# Patient Record
Sex: FEMALE
[De-identification: names, ages, dates, MRNs, and addresses within clinical notes are randomized per-mention and may not be internally consistent; named-entity substitution may affect disease eponyms.]

---

## 2018-05-16 ENCOUNTER — APPOINTMENT (OUTPATIENT)
Dept: FAMILY MEDICINE | Facility: CLINIC | Age: 83
End: 2018-05-16
Payer: MEDICARE

## 2018-05-16 VITALS
SYSTOLIC BLOOD PRESSURE: 166 MMHG | BODY MASS INDEX: 23.99 KG/M2 | OXYGEN SATURATION: 99 % | HEART RATE: 93 BPM | RESPIRATION RATE: 16 BRPM | WEIGHT: 119 LBS | HEIGHT: 59 IN | DIASTOLIC BLOOD PRESSURE: 79 MMHG

## 2018-05-16 DIAGNOSIS — Z83.3 FAMILY HISTORY OF DIABETES MELLITUS: ICD-10-CM

## 2018-05-16 DIAGNOSIS — Z78.9 OTHER SPECIFIED HEALTH STATUS: ICD-10-CM

## 2018-05-16 DIAGNOSIS — M10.9 GOUT, UNSPECIFIED: ICD-10-CM

## 2018-05-16 DIAGNOSIS — Z82.49 FAMILY HISTORY OF ISCHEMIC HEART DISEASE AND OTHER DISEASES OF THE CIRCULATORY SYSTEM: ICD-10-CM

## 2018-05-16 PROCEDURE — G0438: CPT

## 2018-05-23 NOTE — HISTORY OF PRESENT ILLNESS
[Family Member] : family member [de-identified] : Patient presents to establish care. She reports feeling well and notes that she has not seen a physician for several months. She experiences some joint pain, particularly in her upper arms, but it is intermittent and resolves with rest.

## 2018-05-23 NOTE — PHYSICAL EXAM
[No Acute Distress] : no acute distress [Well Nourished] : well nourished [Well Developed] : well developed [Well-Appearing] : well-appearing [Normal Sclera/Conjunctiva] : normal sclera/conjunctiva [Normal Outer Ear/Nose] : the outer ears and nose were normal in appearance [Normal Oropharynx] : the oropharynx was normal [No JVD] : no jugular venous distention [Supple] : supple [No Respiratory Distress] : no respiratory distress  [Clear to Auscultation] : lungs were clear to auscultation bilaterally [No Accessory Muscle Use] : no accessory muscle use [Normal Rate] : normal rate  [Regular Rhythm] : with a regular rhythm [Pedal Pulses Present] : the pedal pulses are present [No Edema] : there was no peripheral edema [Soft] : abdomen soft [Non Tender] : non-tender [Normal Bowel Sounds] : normal bowel sounds [Normal Posterior Cervical Nodes] : no posterior cervical lymphadenopathy [No CVA Tenderness] : no CVA  tenderness [No Spinal Tenderness] : no spinal tenderness [No Joint Swelling] : no joint swelling [Grossly Normal Strength/Tone] : grossly normal strength/tone [No Rash] : no rash [Normal Gait] : normal gait [Coordination Grossly Intact] : coordination grossly intact [Normal Affect] : the affect was normal [Normal Mood] : the mood was normal [Normal Insight/Judgement] : insight and judgment were intact [de-identified] : cervical lymphadenopathy [de-identified] : murmur [de-identified] : anterior cervical lymphadenopathy

## 2018-05-23 NOTE — HEALTH RISK ASSESSMENT
[Good] : ~his/her~ current health as good [Very Good] : ~his/her~  mood as very good [No falls in past year] : Patient reported no falls in the past year [0] : 2) Feeling down, depressed, or hopeless: Not at all (0) [] : No [[id number]] : 0 [Change in mental status noted] : No change in mental status noted [Language] : denies difficulty with language [Behavior] : denies difficulty with behavior [Learning/Retaining New Information] : denies difficulty learning/retaining new information [Handling Complex Tasks] : denies difficulty handling complex tasks [Reasoning] : denies difficulty with reasoning [Spatial Ability and Orientation] : denies difficulty with spatial ability and orientation [None] : None [With Family] : lives with family [Retired] : retired [Single] : single [Sexually Active] : not sexually active [Feels Safe at Home] : Feels safe at home [Fully functional (bathing, dressing, toileting, transferring, walking, feeding)] : Fully functional (bathing, dressing, toileting, transferring, walking, feeding) [Fully functional (using the telephone, shopping, preparing meals, housekeeping, doing laundry, using] : Fully functional and needs no help or supervision to perform IADLs (using the telephone, shopping, preparing meals, housekeeping, doing laundry, using transportation, managing medications and managing finances) [Reports changes in hearing] : Reports no changes in hearing [Reports changes in vision] : Reports no changes in vision [Reports changes in dental health] : Reports no changes in dental health

## 2018-05-23 NOTE — PLAN
[FreeTextEntry1] : HCM and DM II- obtain labs\par Hypertension- BP not at goal; patient reports having been under a great deal of stress due to her sister's recent illness; follow up BP in 2 weeks; murmur appreciated on exam- will refer to cardio for echo\par Cervical lymphadenopathy- obtain US neck

## 2018-05-31 ENCOUNTER — RX RENEWAL (OUTPATIENT)
Age: 83
End: 2018-05-31

## 2018-05-31 LAB
25(OH)D3 SERPL-MCNC: 41.8 NG/ML
ALBUMIN SERPL ELPH-MCNC: 4.4 G/DL
ALP BLD-CCNC: 74 U/L
ALT SERPL-CCNC: 14 U/L
ANION GAP SERPL CALC-SCNC: 10 MMOL/L
AST SERPL-CCNC: 22 U/L
BASOPHILS # BLD AUTO: 0.01 K/UL
BASOPHILS NFR BLD AUTO: 0.2 %
BILIRUB SERPL-MCNC: 0.6 MG/DL
BUN SERPL-MCNC: 23 MG/DL
CALCIUM SERPL-MCNC: 9.6 MG/DL
CHLORIDE SERPL-SCNC: 104 MMOL/L
CHOLEST SERPL-MCNC: 105 MG/DL
CHOLEST/HDLC SERPL: 3 RATIO
CO2 SERPL-SCNC: 28 MMOL/L
CREAT SERPL-MCNC: 1.27 MG/DL
EOSINOPHIL # BLD AUTO: 0.08 K/UL
EOSINOPHIL NFR BLD AUTO: 1.7 %
GLUCOSE SERPL-MCNC: 114 MG/DL
HBA1C MFR BLD HPLC: 5.9 %
HCT VFR BLD CALC: 35.3 %
HDLC SERPL-MCNC: 35 MG/DL
HGB BLD-MCNC: 11.7 G/DL
IMM GRANULOCYTES NFR BLD AUTO: 0.2 %
LDLC SERPL CALC-MCNC: 42 MG/DL
LYMPHOCYTES # BLD AUTO: 1.66 K/UL
LYMPHOCYTES NFR BLD AUTO: 34.3 %
MAN DIFF?: NORMAL
MCHC RBC-ENTMCNC: 31.6 PG
MCHC RBC-ENTMCNC: 33.1 GM/DL
MCV RBC AUTO: 95.4 FL
MONOCYTES # BLD AUTO: 0.33 K/UL
MONOCYTES NFR BLD AUTO: 6.8 %
NEUTROPHILS # BLD AUTO: 2.75 K/UL
NEUTROPHILS NFR BLD AUTO: 56.8 %
PLATELET # BLD AUTO: 117 K/UL
POTASSIUM SERPL-SCNC: 3.9 MMOL/L
PROT SERPL-MCNC: 7.2 G/DL
RBC # BLD: 3.7 M/UL
RBC # FLD: 14.7 %
SODIUM SERPL-SCNC: 142 MMOL/L
TRIGL SERPL-MCNC: 142 MG/DL
TSH SERPL-ACNC: 3.22 UIU/ML
WBC # FLD AUTO: 4.84 K/UL

## 2018-08-15 ENCOUNTER — APPOINTMENT (OUTPATIENT)
Dept: CARDIOLOGY | Facility: CLINIC | Age: 83
End: 2018-08-15

## 2018-08-20 ENCOUNTER — RX RENEWAL (OUTPATIENT)
Age: 83
End: 2018-08-20

## 2018-10-04 ENCOUNTER — APPOINTMENT (OUTPATIENT)
Dept: FAMILY MEDICINE | Facility: CLINIC | Age: 83
End: 2018-10-04
Payer: MEDICARE

## 2018-10-04 VITALS
HEART RATE: 86 BPM | SYSTOLIC BLOOD PRESSURE: 170 MMHG | OXYGEN SATURATION: 99 % | DIASTOLIC BLOOD PRESSURE: 73 MMHG | HEIGHT: 59 IN | RESPIRATION RATE: 14 BRPM

## 2018-10-04 PROCEDURE — 99213 OFFICE O/P EST LOW 20 MIN: CPT

## 2018-10-04 RX ORDER — LISINOPRIL AND HYDROCHLOROTHIAZIDE TABLETS 20; 25 MG/1; MG/1
20-25 TABLET ORAL DAILY
Qty: 90 | Refills: 0 | Status: COMPLETED | COMMUNITY
Start: 2018-05-16 | End: 2018-10-04

## 2018-10-12 NOTE — PLAN
[FreeTextEntry1] : Patient with impaired balance and dizziness- need to exclude cardiac and neuro etiologies. Neuro exam is within normal limits on exam. Would like to obtain carotid dopplers, echo and brain imaging however patient's family expresses concerns regarding costs. They indicate that it is less expensive to see a specialist than to undergo imaging and would like to pursue that route at this time. Will obtain labs and refer to cardio and neuro. Patient understands that she should go to the ER immediately for any progression in symptoms. Blood pressure is not well controlled. It is possible that her elevated BP is contributing to her symptoms. Will increase ACE-I now and follow up BP in 2 weeks.

## 2018-10-12 NOTE — PHYSICAL EXAM
[No Acute Distress] : no acute distress [Well Nourished] : well nourished [Well Developed] : well developed [Well-Appearing] : well-appearing [Normal Sclera/Conjunctiva] : normal sclera/conjunctiva [PERRL] : pupils equal round and reactive to light [EOMI] : extraocular movements intact [Normal Outer Ear/Nose] : the outer ears and nose were normal in appearance [Normal Oropharynx] : the oropharynx was normal [Normal TMs] : both tympanic membranes were normal [No JVD] : no jugular venous distention [Supple] : supple [No Lymphadenopathy] : no lymphadenopathy [No Respiratory Distress] : no respiratory distress  [Clear to Auscultation] : lungs were clear to auscultation bilaterally [No Accessory Muscle Use] : no accessory muscle use [Normal Rate] : normal rate  [Regular Rhythm] : with a regular rhythm [Normal S1, S2] : normal S1 and S2 [Pedal Pulses Present] : the pedal pulses are present [No Edema] : there was no peripheral edema [Soft] : abdomen soft [Non Tender] : non-tender [Non-distended] : non-distended [Normal Bowel Sounds] : normal bowel sounds [Normal Supraclavicular Nodes] : no supraclavicular lymphadenopathy [Normal Posterior Cervical Nodes] : no posterior cervical lymphadenopathy [Normal Anterior Cervical Nodes] : no anterior cervical lymphadenopathy [No CVA Tenderness] : no CVA  tenderness [No Spinal Tenderness] : no spinal tenderness [No Joint Swelling] : no joint swelling [Grossly Normal Strength/Tone] : grossly normal strength/tone [No Rash] : no rash [Normal Gait] : normal gait [Coordination Grossly Intact] : coordination grossly intact [No Focal Deficits] : no focal deficits [Normal Affect] : the affect was normal [Normal Insight/Judgement] : insight and judgment were intact [de-identified] : murmur

## 2018-10-12 NOTE — HISTORY OF PRESENT ILLNESS
[FreeTextEntry8] : Patient presents with 2-month history of impaired balance. She reports feeling dizzy. It is worse when standing but also occurs when laying down in bed. No headache, change in vision or weakness.

## 2018-10-16 LAB
ALBUMIN SERPL ELPH-MCNC: 4.7 G/DL
ALP BLD-CCNC: 65 U/L
ALT SERPL-CCNC: 16 U/L
ANION GAP SERPL CALC-SCNC: 18 MMOL/L
AST SERPL-CCNC: 26 U/L
BASOPHILS # BLD AUTO: 0.01 K/UL
BASOPHILS NFR BLD AUTO: 0.2 %
BILIRUB SERPL-MCNC: 0.8 MG/DL
BUN SERPL-MCNC: 31 MG/DL
CALCIUM SERPL-MCNC: 10.2 MG/DL
CHLORIDE SERPL-SCNC: 100 MMOL/L
CHOLEST SERPL-MCNC: 89 MG/DL
CHOLEST/HDLC SERPL: 2.6 RATIO
CO2 SERPL-SCNC: 23 MMOL/L
CREAT SERPL-MCNC: 1.43 MG/DL
CRP SERPL-MCNC: <0.1 MG/DL
EOSINOPHIL # BLD AUTO: 0.04 K/UL
EOSINOPHIL NFR BLD AUTO: 0.7 %
ERYTHROCYTE [SEDIMENTATION RATE] IN BLOOD BY WESTERGREN METHOD: 19 MM/HR
GLUCOSE SERPL-MCNC: 130 MG/DL
HCT VFR BLD CALC: 32.9 %
HDLC SERPL-MCNC: 34 MG/DL
HGB BLD-MCNC: 11 G/DL
IMM GRANULOCYTES NFR BLD AUTO: 0.4 %
LDLC SERPL CALC-MCNC: 33 MG/DL
LYMPHOCYTES # BLD AUTO: 2.36 K/UL
LYMPHOCYTES NFR BLD AUTO: 43.9 %
MAN DIFF?: NORMAL
MCHC RBC-ENTMCNC: 32.4 PG
MCHC RBC-ENTMCNC: 33.4 GM/DL
MCV RBC AUTO: 96.8 FL
MONOCYTES # BLD AUTO: 0.42 K/UL
MONOCYTES NFR BLD AUTO: 7.8 %
NEUTROPHILS # BLD AUTO: 2.52 K/UL
NEUTROPHILS NFR BLD AUTO: 47 %
PLATELET # BLD AUTO: 135 K/UL
POTASSIUM SERPL-SCNC: 4.2 MMOL/L
PROT SERPL-MCNC: 7.3 G/DL
RBC # BLD: 3.4 M/UL
RBC # FLD: 14.9 %
SODIUM SERPL-SCNC: 141 MMOL/L
TRIGL SERPL-MCNC: 112 MG/DL
TSH SERPL-ACNC: 2.5 UIU/ML
VIT B12 SERPL-MCNC: 434 PG/ML
WBC # FLD AUTO: 5.37 K/UL

## 2018-10-22 ENCOUNTER — APPOINTMENT (OUTPATIENT)
Dept: FAMILY MEDICINE | Facility: CLINIC | Age: 83
End: 2018-10-22

## 2018-10-24 ENCOUNTER — APPOINTMENT (OUTPATIENT)
Dept: FAMILY MEDICINE | Facility: CLINIC | Age: 83
End: 2018-10-24
Payer: MEDICARE

## 2018-10-24 VITALS
SYSTOLIC BLOOD PRESSURE: 149 MMHG | RESPIRATION RATE: 14 BRPM | WEIGHT: 119 LBS | BODY MASS INDEX: 23.99 KG/M2 | OXYGEN SATURATION: 98 % | HEIGHT: 59 IN | DIASTOLIC BLOOD PRESSURE: 78 MMHG | HEART RATE: 86 BPM

## 2018-10-24 DIAGNOSIS — N28.9 DISORDER OF KIDNEY AND URETER, UNSPECIFIED: ICD-10-CM

## 2018-10-24 PROCEDURE — 99213 OFFICE O/P EST LOW 20 MIN: CPT

## 2018-10-24 RX ORDER — METFORMIN HYDROCHLORIDE 500 MG/1
500 TABLET, FILM COATED, EXTENDED RELEASE ORAL
Qty: 90 | Refills: 0 | Status: DISCONTINUED | COMMUNITY
Start: 2018-05-16 | End: 2018-10-24

## 2018-10-24 RX ORDER — HYDROCHLOROTHIAZIDE 25 MG/1
25 TABLET ORAL DAILY
Qty: 30 | Refills: 1 | Status: COMPLETED | COMMUNITY
Start: 2018-10-04 | End: 2018-10-24

## 2018-10-24 RX ORDER — LISINOPRIL 30 MG/1
30 TABLET ORAL DAILY
Qty: 30 | Refills: 1 | Status: COMPLETED | COMMUNITY
Start: 2018-10-04 | End: 2018-10-24

## 2018-10-29 ENCOUNTER — APPOINTMENT (OUTPATIENT)
Dept: FAMILY MEDICINE | Facility: CLINIC | Age: 83
End: 2018-10-29

## 2018-10-29 ENCOUNTER — OTHER (OUTPATIENT)
Age: 83
End: 2018-10-29

## 2018-11-01 ENCOUNTER — APPOINTMENT (OUTPATIENT)
Dept: CARDIOLOGY | Facility: CLINIC | Age: 83
End: 2018-11-01
Payer: MEDICARE

## 2018-11-01 PROCEDURE — 93306 TTE W/DOPPLER COMPLETE: CPT

## 2018-11-02 LAB
ANION GAP SERPL CALC-SCNC: 12 MMOL/L
BASOPHILS # BLD AUTO: 0.01 K/UL
BASOPHILS NFR BLD AUTO: 0.2 %
BUN SERPL-MCNC: 28 MG/DL
CALCIUM SERPL-MCNC: 10.2 MG/DL
CHLORIDE SERPL-SCNC: 107 MMOL/L
CO2 SERPL-SCNC: 24 MMOL/L
CREAT SERPL-MCNC: 1.15 MG/DL
EOSINOPHIL # BLD AUTO: 0.06 K/UL
EOSINOPHIL NFR BLD AUTO: 1.5 %
GLUCOSE SERPL-MCNC: 125 MG/DL
HCT VFR BLD CALC: 33.7 %
HGB BLD-MCNC: 10.8 G/DL
IMM GRANULOCYTES NFR BLD AUTO: 0.2 %
LYMPHOCYTES # BLD AUTO: 2.02 K/UL
LYMPHOCYTES NFR BLD AUTO: 50.1 %
MAN DIFF?: NORMAL
MCHC RBC-ENTMCNC: 32 GM/DL
MCHC RBC-ENTMCNC: 32.5 PG
MCV RBC AUTO: 101.5 FL
MONOCYTES # BLD AUTO: 0.4 K/UL
MONOCYTES NFR BLD AUTO: 9.9 %
NEUTROPHILS # BLD AUTO: 1.53 K/UL
NEUTROPHILS NFR BLD AUTO: 38.1 %
PLATELET # BLD AUTO: 116 K/UL
POTASSIUM SERPL-SCNC: 4.6 MMOL/L
RBC # BLD: 3.32 M/UL
RBC # FLD: 14.8 %
SODIUM SERPL-SCNC: 143 MMOL/L
WBC # FLD AUTO: 4.03 K/UL

## 2018-11-06 ENCOUNTER — NON-APPOINTMENT (OUTPATIENT)
Age: 83
End: 2018-11-06

## 2018-11-06 ENCOUNTER — APPOINTMENT (OUTPATIENT)
Dept: CARDIOLOGY | Facility: CLINIC | Age: 83
End: 2018-11-06
Payer: MEDICARE

## 2018-11-06 VITALS
WEIGHT: 116 LBS | OXYGEN SATURATION: 99 % | HEIGHT: 59 IN | DIASTOLIC BLOOD PRESSURE: 70 MMHG | BODY MASS INDEX: 23.39 KG/M2 | HEART RATE: 70 BPM | SYSTOLIC BLOOD PRESSURE: 137 MMHG

## 2018-11-06 DIAGNOSIS — R06.09 OTHER FORMS OF DYSPNEA: ICD-10-CM

## 2018-11-06 PROCEDURE — 93000 ELECTROCARDIOGRAM COMPLETE: CPT

## 2018-11-06 PROCEDURE — 99205 OFFICE O/P NEW HI 60 MIN: CPT

## 2018-11-06 RX ORDER — CHLORTHALIDONE 25 MG/1
25 TABLET ORAL
Qty: 30 | Refills: 1 | Status: DISCONTINUED | COMMUNITY
Start: 2018-10-24 | End: 2018-11-06

## 2018-11-06 NOTE — REVIEW OF SYSTEMS
[Dyspnea on Exertion] : dyspnea on exertion [Dizziness] : dizziness [Negative] : Genitourinary [FreeTextEntry9] : bilateral shoulder pain [de-identified] : emotional stress

## 2018-11-06 NOTE — REVIEW OF SYSTEMS
[see HPI] : see HPI [Dyspnea on exertion] : dyspnea during exertion [Dizziness] : dizziness [Negative] : Heme/Lymph

## 2018-11-06 NOTE — REVIEW OF SYSTEMS
[Dyspnea on Exertion] : dyspnea on exertion [Dizziness] : dizziness [Negative] : Genitourinary [FreeTextEntry9] : bilateral shoulder pain [de-identified] : emotional stress

## 2018-11-06 NOTE — PHYSICAL EXAM
[No Acute Distress] : no acute distress [Well-Appearing] : well-appearing [PERRL] : pupils equal round and reactive to light [Normal Outer Ear/Nose] : the outer ears and nose were normal in appearance [Normal Oropharynx] : the oropharynx was normal [Normal TMs] : both tympanic membranes were normal [No Respiratory Distress] : no respiratory distress  [Clear to Auscultation] : lungs were clear to auscultation bilaterally [No Accessory Muscle Use] : no accessory muscle use [Normal Rate] : normal rate  [Regular Rhythm] : with a regular rhythm [No Spinal Tenderness] : no spinal tenderness [Grossly Normal Strength/Tone] : grossly normal strength/tone [Normal Gait] : normal gait [Coordination Grossly Intact] : coordination grossly intact [Normal Affect] : the affect was normal [Normal Insight/Judgement] : insight and judgment were intact [de-identified] : catherine

## 2018-11-06 NOTE — PHYSICAL EXAM
[Well Groomed] : well groomed [General Appearance - In No Acute Distress] : no acute distress [Normal Conjunctiva] : the conjunctiva exhibited no abnormalities [Eyelids - No Xanthelasma] : the eyelids demonstrated no xanthelasmas [Normal Oral Mucosa] : normal oral mucosa [No Oral Pallor] : no oral pallor [No Oral Cyanosis] : no oral cyanosis [Normal Jugular Venous A Waves Present] : normal jugular venous A waves present [Normal Jugular Venous V Waves Present] : normal jugular venous V waves present [No Jugular Venous Roth A Waves] : no jugular venous roth A waves [Normal Rate] : normal [Rhythm Regular] : regular [Normal S1] : normal S1 [Normal S2] : normal S2 [III] : a grade 3 [Crescendo-Decrescendo] : crescendo-decrescendo [Carotids] : the murmur was transmitted to the carotid arteries [1+] : left 1+ [No Pitting Edema] : no pitting edema present [Respiration, Rhythm And Depth] : normal respiratory rhythm and effort [Exaggerated Use Of Accessory Muscles For Inspiration] : no accessory muscle use [Auscultation Breath Sounds / Voice Sounds] : lungs were clear to auscultation bilaterally [Abdomen Soft] : soft [Abdomen Tenderness] : non-tender [Abdomen Mass (___ Cm)] : no abdominal mass palpated [Abnormal Walk] : normal gait [Gait - Sufficient For Exercise Testing] : the gait was sufficient for exercise testing [Nail Clubbing] : no clubbing of the fingernails [Cyanosis, Localized] : no localized cyanosis [Petechial Hemorrhages (___cm)] : no petechial hemorrhages [Skin Color & Pigmentation] : normal skin color and pigmentation [] : no rash [No Venous Stasis] : no venous stasis [Skin Lesions] : no skin lesions [No Skin Ulcers] : no skin ulcer [No Xanthoma] : no  xanthoma was observed [Oriented To Time, Place, And Person] : oriented to person, place, and time [Affect] : the affect was normal [Mood] : the mood was normal [No Anxiety] : not feeling anxious [Bruit] : no bruit heard

## 2018-11-06 NOTE — PLAN
[FreeTextEntry1] : 1. Dizziness- patient has a 2-month history of dizziness. She is anemic and is taking multiple medications. Blood pressure is not controlled. Will adjust medication at this time and closely follow renal function. Patient has not yet seen cardio. Importance of seeing cardio explained to the patient and her nephew. I would like for her to undergo an echo and carotid doppler.\par 2. Impaired renal function- hold glumetza. Patient's last A1C was stable and it is unclear as to whether patient was diabetic or pre-diabetic previously. Will continue to monitor glucose off medication.\par 3. Hypertension and dyspnea on exertion- hold ACE and HCTZ until we obtain repeat BMP next week. If renal function improves off glumetza, will resume lisinopril. In the interim, will start amlodipine and chlorthalidone. Will need to monitor patient carefully on chlorthalidone in view of dizziness and renal function. BP monitor for at home ordered. Again importance of cardio eval advised. \par 4. Anemia and thrombocytopenia- while patient's dizziness may be secondary to labile blood pressure or current medications, patient is also anemic and thrombocytopenic. Importance of pursuing hematology evaluation again discussed. \par \par The patient and her son understand they should contact me for any changes in symptoms. We will speak next week after we obtain repeat labs. Follow up blood pressure in 2 weeks.

## 2018-11-06 NOTE — PHYSICAL EXAM
[No Acute Distress] : no acute distress [Well-Appearing] : well-appearing [PERRL] : pupils equal round and reactive to light [Normal Outer Ear/Nose] : the outer ears and nose were normal in appearance [Normal Oropharynx] : the oropharynx was normal [Normal TMs] : both tympanic membranes were normal [No Respiratory Distress] : no respiratory distress  [Clear to Auscultation] : lungs were clear to auscultation bilaterally [No Accessory Muscle Use] : no accessory muscle use [Normal Rate] : normal rate  [Regular Rhythm] : with a regular rhythm [No Spinal Tenderness] : no spinal tenderness [Grossly Normal Strength/Tone] : grossly normal strength/tone [Normal Gait] : normal gait [Coordination Grossly Intact] : coordination grossly intact [Normal Affect] : the affect was normal [Normal Insight/Judgement] : insight and judgment were intact [de-identified] : catherine

## 2018-11-06 NOTE — HISTORY OF PRESENT ILLNESS
[FreeTextEntry1] : 88 year old woman with hypertension, diabetes, anemia presents for an initial cardiac evaluation. \par \par She has been complaining of more dizziness, imbalance especially when changing positions. It resolves within seconds. Usually occurs in the morning. She has been more stressed recently because her sister has been more ill. Per her son she has been not eating as well. She has lost about 13 pounds in the last 4 months. \par \par She   denies any chest pain, PND, orthopnea, lower extremity edema,   syncope, strokelike symptoms. She sometimes will have mild dyspnea on strenuous exertion. She is complaining of left arm that occurs when reaching for things or carrying heavy objects. \par \par She is compliant with her medications.  \par

## 2018-11-06 NOTE — HISTORY OF PRESENT ILLNESS
[de-identified] : Patient presents for follow up. \par Continues to feel dizzy but symptoms have not worsened since last visit. Patient's sister is still in the hospital and this is causing her a great deal of stress. \par

## 2018-11-06 NOTE — DISCUSSION/SUMMARY
[FreeTextEntry1] : 88 year woman with a history as listed presents for an initial cardiac evaluation. \par Molly has been complaining of more intermittent dizziness. I think that this is a function of her poor PO intake and anti HTN regiment. She should for now stop the Chlorthalidone and continue on Lisinopril 20mg Qday and Norvasc 5mg Qday. Encouraged her to increase her PO intake. She will get a carotid to assess for significant obstructive disease. \par \par Based on her echo she has moderate to severe AS. This will need to be monitored over time. She has mild dyspnea on significant exertion. She will undergo a nuclear stress test to rule out underlying CAD and assess her exercise tolerance. \par  \par She will continue her lipitor 20mg HS. Her LDL is at goal. \par \par Given new data, she may not be the best candidate to ASA as primary prevention. I will readdress this after her above testing is completed. \par \par Exercise and diet counseling was performed in order to reduce her future cardiovascular risk. \par She will followup with me in 2 month or sooner if necessary. \par stop chlorthalidones

## 2018-11-06 NOTE — HISTORY OF PRESENT ILLNESS
[de-identified] : Patient presents for follow up. \par Continues to feel dizzy but symptoms have not worsened since last visit. Patient's sister is still in the hospital and this is causing her a great deal of stress. \par

## 2018-11-08 ENCOUNTER — RESULT CHARGE (OUTPATIENT)
Age: 83
End: 2018-11-08

## 2018-11-08 ENCOUNTER — APPOINTMENT (OUTPATIENT)
Dept: FAMILY MEDICINE | Facility: CLINIC | Age: 83
End: 2018-11-08

## 2018-11-16 ENCOUNTER — APPOINTMENT (OUTPATIENT)
Dept: CARDIOLOGY | Facility: CLINIC | Age: 83
End: 2018-11-16
Payer: MEDICARE

## 2018-11-16 PROCEDURE — 93880 EXTRACRANIAL BILAT STUDY: CPT

## 2018-12-10 ENCOUNTER — APPOINTMENT (OUTPATIENT)
Dept: FAMILY MEDICINE | Facility: CLINIC | Age: 83
End: 2018-12-10
Payer: MEDICARE

## 2018-12-10 VITALS
OXYGEN SATURATION: 100 % | SYSTOLIC BLOOD PRESSURE: 140 MMHG | DIASTOLIC BLOOD PRESSURE: 68 MMHG | RESPIRATION RATE: 14 BRPM | HEART RATE: 90 BPM

## 2018-12-10 VITALS
DIASTOLIC BLOOD PRESSURE: 68 MMHG | RESPIRATION RATE: 14 BRPM | OXYGEN SATURATION: 100 % | HEART RATE: 90 BPM | SYSTOLIC BLOOD PRESSURE: 140 MMHG

## 2018-12-10 PROCEDURE — 99214 OFFICE O/P EST MOD 30 MIN: CPT | Mod: 25

## 2018-12-10 PROCEDURE — 90732 PPSV23 VACC 2 YRS+ SUBQ/IM: CPT

## 2018-12-10 PROCEDURE — G0009: CPT

## 2018-12-10 PROCEDURE — G0008: CPT

## 2018-12-10 PROCEDURE — 90688 IIV4 VACCINE SPLT 0.5 ML IM: CPT

## 2018-12-10 NOTE — ASSESSMENT
[FreeTextEntry1] : 89 year old female with PMH of HTN presenting with acute complaint of left arm pain likely secondary to musculoskeletal strain.

## 2018-12-10 NOTE — PLAN
[FreeTextEntry1] : 1. Left arm pain - Likely musculoskeletal in nature. Recommended to try using and heating packs and if pain does not subside wrote a prescription out for physical therapy.\par \par 2. Review of carotid doppler ultrasound - Informed patient she has minimal stenosis with no need for intervention at this time.\par \par 3. HCM - Administered high dose influenza vaccine and pneumovax vaccine. Advised patient that she will require the prevnar vaccine next year. Would have administered the prevnar prior to the pneumovax however the prevnar vaccine was not in stock at the clinic at this time.

## 2018-12-10 NOTE — HISTORY OF PRESENT ILLNESS
[FreeTextEntry8] : 89 year old female presenting with a chief complaint of left arm pain. Patient states she has had sharp stabbing pain in her left upper arm around the tricep area as well as weakness when she lifts it over the past 2 months. Patient has not taken anything for the pain and stated she just woke up one day and felt the sharp pain when she went to lift an object. Patient occasionally sleeps on the left side of her body. Patient denies any recent falls, patient has been on a statin for the past 3 months. Patient would also like the flu shot, pneumonia shot and to go over the results of her carotid doppler. Patient cannot recall if she had a pneumonia shot in the past. \par

## 2018-12-10 NOTE — REVIEW OF SYSTEMS
[Shortness Of Breath] : shortness of breath [Muscle Pain] : muscle pain [Negative] : Psychiatric [FreeTextEntry9] : +muscle pain in left upper arm

## 2018-12-10 NOTE — PHYSICAL EXAM
[No Acute Distress] : no acute distress [Well Nourished] : well nourished [Well Developed] : well developed [Well-Appearing] : well-appearing [Normal Sclera/Conjunctiva] : normal sclera/conjunctiva [EOMI] : extraocular movements intact [No JVD] : no jugular venous distention [No Respiratory Distress] : no respiratory distress  [Clear to Auscultation] : lungs were clear to auscultation bilaterally [Normal Rate] : normal rate  [Regular Rhythm] : with a regular rhythm [Normal S1, S2] : normal S1 and S2 [No Carotid Bruits] : no carotid bruits [No Edema] : there was no peripheral edema [Soft] : abdomen soft [Non Tender] : non-tender [Non-distended] : non-distended [No HSM] : no HSM [Normal Bowel Sounds] : normal bowel sounds [No Joint Swelling] : no joint swelling [Grossly Normal Strength/Tone] : grossly normal strength/tone [No Rash] : no rash [Normal Gait] : normal gait [Coordination Grossly Intact] : coordination grossly intact [No Focal Deficits] : no focal deficits [Normal Affect] : the affect was normal [Normal Insight/Judgement] : insight and judgment were intact [de-identified] : +systolic murmur  [de-identified] : Full range of motion in upper extremities bilaterally, pain with movement of left arm anteriorly

## 2018-12-14 ENCOUNTER — APPOINTMENT (OUTPATIENT)
Dept: CARDIOLOGY | Facility: CLINIC | Age: 83
End: 2018-12-14

## 2018-12-19 ENCOUNTER — RX RENEWAL (OUTPATIENT)
Age: 83
End: 2018-12-19

## 2019-01-16 ENCOUNTER — NON-APPOINTMENT (OUTPATIENT)
Age: 84
End: 2019-01-16

## 2019-01-16 ENCOUNTER — APPOINTMENT (OUTPATIENT)
Dept: CARDIOLOGY | Facility: CLINIC | Age: 84
End: 2019-01-16
Payer: MEDICARE

## 2019-01-16 VITALS
TEMPERATURE: 97.9 F | HEIGHT: 59 IN | SYSTOLIC BLOOD PRESSURE: 155 MMHG | DIASTOLIC BLOOD PRESSURE: 74 MMHG | RESPIRATION RATE: 16 BRPM | WEIGHT: 120 LBS | HEART RATE: 91 BPM | BODY MASS INDEX: 24.19 KG/M2 | OXYGEN SATURATION: 100 %

## 2019-01-16 PROCEDURE — 99214 OFFICE O/P EST MOD 30 MIN: CPT

## 2019-01-16 PROCEDURE — 93000 ELECTROCARDIOGRAM COMPLETE: CPT

## 2019-01-16 NOTE — HISTORY OF PRESENT ILLNESS
[FreeTextEntry1] : 88 year old woman with hypertension, diabetes, anemia presents for a followup visit. \par \par Since her last visit her dizziness has essentially resolved. She states that sometimes if she stands too quickly she feels it but otherwise she is feeling much better. \par She   denies any chest pain, PND, orthopnea, lower extremity edema,   syncope, strokelike symptoms. She sometimes will have mild dyspnea on strenuous exertion. She is complaining of left arm that occurs when reaching for things or carrying heavy objects. \par \par She is compliant with her medications but she forgot to take her medications today. \par She had an echo in 11/2018 that showed normal systolic LV function  with moderate AS (BETY 1 sqcm). She never got her stress test.

## 2019-01-16 NOTE — PHYSICAL EXAM
[Well Groomed] : well groomed [General Appearance - In No Acute Distress] : no acute distress [Normal Conjunctiva] : the conjunctiva exhibited no abnormalities [Eyelids - No Xanthelasma] : the eyelids demonstrated no xanthelasmas [Normal Oral Mucosa] : normal oral mucosa [No Oral Pallor] : no oral pallor [No Oral Cyanosis] : no oral cyanosis [Normal Jugular Venous A Waves Present] : normal jugular venous A waves present [Normal Jugular Venous V Waves Present] : normal jugular venous V waves present [No Jugular Venous Roth A Waves] : no jugular venous roth A waves [Respiration, Rhythm And Depth] : normal respiratory rhythm and effort [Exaggerated Use Of Accessory Muscles For Inspiration] : no accessory muscle use [Auscultation Breath Sounds / Voice Sounds] : lungs were clear to auscultation bilaterally [Abdomen Soft] : soft [Abdomen Tenderness] : non-tender [Abdomen Mass (___ Cm)] : no abdominal mass palpated [Abnormal Walk] : normal gait [Gait - Sufficient For Exercise Testing] : the gait was sufficient for exercise testing [Nail Clubbing] : no clubbing of the fingernails [Cyanosis, Localized] : no localized cyanosis [Petechial Hemorrhages (___cm)] : no petechial hemorrhages [Skin Color & Pigmentation] : normal skin color and pigmentation [] : no rash [No Venous Stasis] : no venous stasis [Skin Lesions] : no skin lesions [No Skin Ulcers] : no skin ulcer [No Xanthoma] : no  xanthoma was observed [Oriented To Time, Place, And Person] : oriented to person, place, and time [Affect] : the affect was normal [Mood] : the mood was normal [No Anxiety] : not feeling anxious [Normal Rate] : normal [Rhythm Regular] : regular [Normal S1] : normal S1 [Normal S2] : normal S2 [III] : a grade 3 [Crescendo-Decrescendo] : crescendo-decrescendo [Carotids] : the murmur was transmitted to the carotid arteries [1+] : left 1+ [Bruit] : no bruit heard [No Pitting Edema] : no pitting edema present

## 2019-01-16 NOTE — DISCUSSION/SUMMARY
[FreeTextEntry1] : 88 year woman with a history as listed presents for an initial cardiac evaluation. \par Molly is doing much better off of the  Chlorthalidone. Her BP is elevated currently likely from not taking her medications today. Continue on Lisinopril 20mg Qday and Norvasc 5mg Qday.   \par \par Based on her echo she has moderate to severe AS. This will need to be monitored over time. She has mild dyspnea on significant exertion. She will undergo a nuclear stress test to rule out underlying CAD and assess her exercise tolerance. \par  \par She was noted to have mild carotid disease. She will continue her lipitor 20mg HS. Her LDL is at goal. Her goal LDL is <100. \par \par Given new data, she is not the best candidate to ASA as primary prevention. I \par \par Exercise and diet counseling was performed in order to reduce her future cardiovascular risk. \par She will followup with me in 2 month or sooner if necessary. \par

## 2019-01-17 ENCOUNTER — APPOINTMENT (OUTPATIENT)
Dept: CARDIOLOGY | Facility: CLINIC | Age: 84
End: 2019-01-17

## 2019-03-04 ENCOUNTER — APPOINTMENT (OUTPATIENT)
Dept: FAMILY MEDICINE | Facility: CLINIC | Age: 84
End: 2019-03-04
Payer: MEDICARE

## 2019-03-04 VITALS
WEIGHT: 128 LBS | HEIGHT: 59 IN | BODY MASS INDEX: 25.8 KG/M2 | SYSTOLIC BLOOD PRESSURE: 139 MMHG | HEART RATE: 53 BPM | DIASTOLIC BLOOD PRESSURE: 73 MMHG | RESPIRATION RATE: 15 BRPM | OXYGEN SATURATION: 98 %

## 2019-03-04 PROCEDURE — 99213 OFFICE O/P EST LOW 20 MIN: CPT

## 2019-03-05 NOTE — HISTORY OF PRESENT ILLNESS
[Family Member] : family member [Other: _____] : [unfilled] [de-identified] : Patient presents for follow up. She has been feeling well. \par Continues to experience left arm pain when reaching for objects overhead.\par Has not followed up with cardiology for nuclear stress test.

## 2019-03-05 NOTE — PHYSICAL EXAM
[No Acute Distress] : no acute distress [Well Nourished] : well nourished [Well Developed] : well developed [Well-Appearing] : well-appearing [Normal Sclera/Conjunctiva] : normal sclera/conjunctiva [PERRL] : pupils equal round and reactive to light [Normal Outer Ear/Nose] : the outer ears and nose were normal in appearance [Normal Oropharynx] : the oropharynx was normal [Normal TMs] : both tympanic membranes were normal [Supple] : supple [No Lymphadenopathy] : no lymphadenopathy [No Respiratory Distress] : no respiratory distress  [Clear to Auscultation] : lungs were clear to auscultation bilaterally [No Accessory Muscle Use] : no accessory muscle use [Normal S1, S2] : normal S1 and S2 [No Edema] : there was no peripheral edema [Soft] : abdomen soft [Non Tender] : non-tender [Normal Bowel Sounds] : normal bowel sounds [Normal Supraclavicular Nodes] : no supraclavicular lymphadenopathy [Normal Posterior Cervical Nodes] : no posterior cervical lymphadenopathy [Normal Anterior Cervical Nodes] : no anterior cervical lymphadenopathy [No CVA Tenderness] : no CVA  tenderness [No Spinal Tenderness] : no spinal tenderness [Normal Gait] : normal gait [Normal Affect] : the affect was normal [Normal Insight/Judgement] : insight and judgment were intact [de-identified] : irregular rate; murmur

## 2019-03-16 ENCOUNTER — LABORATORY RESULT (OUTPATIENT)
Age: 84
End: 2019-03-16

## 2019-03-19 ENCOUNTER — NON-APPOINTMENT (OUTPATIENT)
Age: 84
End: 2019-03-19

## 2019-03-19 ENCOUNTER — APPOINTMENT (OUTPATIENT)
Dept: CARDIOLOGY | Facility: CLINIC | Age: 84
End: 2019-03-19
Payer: MEDICARE

## 2019-03-19 VITALS
HEART RATE: 92 BPM | DIASTOLIC BLOOD PRESSURE: 85 MMHG | WEIGHT: 129 LBS | HEIGHT: 59 IN | BODY MASS INDEX: 26 KG/M2 | SYSTOLIC BLOOD PRESSURE: 191 MMHG | OXYGEN SATURATION: 98 %

## 2019-03-19 PROCEDURE — 93000 ELECTROCARDIOGRAM COMPLETE: CPT

## 2019-03-19 PROCEDURE — 99215 OFFICE O/P EST HI 40 MIN: CPT

## 2019-03-19 NOTE — DISCUSSION/SUMMARY
[FreeTextEntry1] : 89 year woman with a history as listed presents for an initial cardiac evaluation. \par Molly is having bigimeny on her EKG which is new. Her overall symptoms are unchanged from previous. She will undergo a nuclear stress test to rule out underlying CAD and assess her exercise tolerance. She will get a Holter to rule out arrhythmias and assess their PVC burden. \par \par Her blood pressure is very uncontrolled. This could be why she is having frequent PVCs. She will increase her  Norvasc 5mg to q12. She will continue Lisinopril 20mg Qday.  I will add Toprol 50mg Qday. \par \par Based on her echo in 11/2018 she has moderate to severe AS. This will need to be monitored over time. She has mild dyspnea on significant exertion. \par  \par She was noted to have mild carotid disease. She will continue her lipitor 20mg HS. Her LDL is at goal. Her goal LDL is <100. \par \par Given new data, she is not the best candidate to ASA as primary prevention. \par \par Exercise and diet counseling was performed in order to reduce her future cardiovascular risk. \par She will followup with me in 2 month or sooner if necessary. \par

## 2019-03-19 NOTE — HISTORY OF PRESENT ILLNESS
[FreeTextEntry1] : 89 year old woman with hypertension, diabetes, anemia, moderate AS, HFPEF,  presents for a followup visit. \par \par Since her last visit she has been feeling well. She was recently noted to have an abnormal EKG and sent for an evaluation. \par She   denies any chest pain, PND, orthopnea, lower extremity edema,   syncope, strokelike symptoms. She sometimes will have mild dyspnea on strenuous exertion. She is complaining of left arm that occurs when reaching for things or carrying heavy objects. She states that it limits her exercising. .htn \par \par She is compliant with her medications. \par She had an echo in 11/2018 that showed normal systolic LV function  with moderate AS (BETY 1 sqcm). She never got her stress test.

## 2019-03-19 NOTE — PHYSICAL EXAM
[Well Groomed] : well groomed [General Appearance - In No Acute Distress] : no acute distress [Normal Conjunctiva] : the conjunctiva exhibited no abnormalities [Eyelids - No Xanthelasma] : the eyelids demonstrated no xanthelasmas [Normal Oral Mucosa] : normal oral mucosa [No Oral Pallor] : no oral pallor [No Oral Cyanosis] : no oral cyanosis [Normal Jugular Venous A Waves Present] : normal jugular venous A waves present [Normal Jugular Venous V Waves Present] : normal jugular venous V waves present [No Jugular Venous Roth A Waves] : no jugular venous roth A waves [Respiration, Rhythm And Depth] : normal respiratory rhythm and effort [Exaggerated Use Of Accessory Muscles For Inspiration] : no accessory muscle use [Auscultation Breath Sounds / Voice Sounds] : lungs were clear to auscultation bilaterally [Abdomen Soft] : soft [Abdomen Tenderness] : non-tender [Abdomen Mass (___ Cm)] : no abdominal mass palpated [Abnormal Walk] : normal gait [Gait - Sufficient For Exercise Testing] : the gait was sufficient for exercise testing [Nail Clubbing] : no clubbing of the fingernails [Cyanosis, Localized] : no localized cyanosis [Petechial Hemorrhages (___cm)] : no petechial hemorrhages [Skin Color & Pigmentation] : normal skin color and pigmentation [] : no rash [No Venous Stasis] : no venous stasis [Skin Lesions] : no skin lesions [No Skin Ulcers] : no skin ulcer [No Xanthoma] : no  xanthoma was observed [Oriented To Time, Place, And Person] : oriented to person, place, and time [Affect] : the affect was normal [Mood] : the mood was normal [No Anxiety] : not feeling anxious [Normal Rate] : normal [Normal S1] : normal S1 [Normal S2] : normal S2 [III] : a grade 3 [Crescendo-Decrescendo] : crescendo-decrescendo [Carotids] : the murmur was transmitted to the carotid arteries [1+] : left 1+ [No Pitting Edema] : no pitting edema present [Premature Beats] : regular with premature beats [S2 Diminished] : was diminished [Bruit] : no bruit heard

## 2019-03-20 LAB
25(OH)D3 SERPL-MCNC: 44.3 NG/ML
ALBUMIN SERPL ELPH-MCNC: 4.4 G/DL
ALP BLD-CCNC: 84 U/L
ALT SERPL-CCNC: 20 U/L
ANION GAP SERPL CALC-SCNC: 12 MMOL/L
AST SERPL-CCNC: 25 U/L
BASOPHILS # BLD AUTO: 0.01 K/UL
BASOPHILS NFR BLD AUTO: 0.2 %
BILIRUB SERPL-MCNC: 0.6 MG/DL
BUN SERPL-MCNC: 26 MG/DL
CALCIUM SERPL-MCNC: 9.7 MG/DL
CHLORIDE SERPL-SCNC: 106 MMOL/L
CHOLEST SERPL-MCNC: 97 MG/DL
CHOLEST/HDLC SERPL: 2.6 RATIO
CO2 SERPL-SCNC: 23 MMOL/L
CREAT SERPL-MCNC: 1.34 MG/DL
EOSINOPHIL # BLD AUTO: 0.07 K/UL
EOSINOPHIL NFR BLD AUTO: 1.3 %
GLUCOSE SERPL-MCNC: 128 MG/DL
HBA1C MFR BLD HPLC: 5.8 %
HCT VFR BLD CALC: 36 %
HDLC SERPL-MCNC: 37 MG/DL
HGB BLD-MCNC: 11.6 G/DL
IMM GRANULOCYTES NFR BLD AUTO: 0.4 %
LDLC SERPL CALC-MCNC: 37 MG/DL
LYMPHOCYTES # BLD AUTO: 2.02 K/UL
LYMPHOCYTES NFR BLD AUTO: 38.5 %
MAN DIFF?: NORMAL
MCHC RBC-ENTMCNC: 31.5 PG
MCHC RBC-ENTMCNC: 32.2 GM/DL
MCV RBC AUTO: 97.8 FL
MONOCYTES # BLD AUTO: 0.39 K/UL
MONOCYTES NFR BLD AUTO: 7.4 %
NEUTROPHILS # BLD AUTO: 2.74 K/UL
NEUTROPHILS NFR BLD AUTO: 52.2 %
PLATELET # BLD AUTO: 118 K/UL
POTASSIUM SERPL-SCNC: 4 MMOL/L
PROT SERPL-MCNC: 7.3 G/DL
RBC # BLD: 3.68 M/UL
RBC # FLD: 14.2 %
SODIUM SERPL-SCNC: 141 MMOL/L
TRIGL SERPL-MCNC: 113 MG/DL
TSH SERPL-ACNC: 3.47 UIU/ML
WBC # FLD AUTO: 5.25 K/UL

## 2019-04-16 ENCOUNTER — APPOINTMENT (OUTPATIENT)
Dept: CARDIOLOGY | Facility: CLINIC | Age: 84
End: 2019-04-16

## 2019-04-17 ENCOUNTER — APPOINTMENT (OUTPATIENT)
Dept: CARDIOLOGY | Facility: CLINIC | Age: 84
End: 2019-04-17
Payer: MEDICARE

## 2019-04-17 PROCEDURE — 93015 CV STRESS TEST SUPVJ I&R: CPT

## 2019-04-17 PROCEDURE — 78452 HT MUSCLE IMAGE SPECT MULT: CPT

## 2019-04-17 PROCEDURE — A9500: CPT

## 2019-05-08 ENCOUNTER — NON-APPOINTMENT (OUTPATIENT)
Age: 84
End: 2019-05-08

## 2019-05-08 ENCOUNTER — APPOINTMENT (OUTPATIENT)
Dept: CARDIOLOGY | Facility: CLINIC | Age: 84
End: 2019-05-08
Payer: MEDICARE

## 2019-05-08 VITALS
HEIGHT: 59 IN | HEART RATE: 72 BPM | DIASTOLIC BLOOD PRESSURE: 80 MMHG | SYSTOLIC BLOOD PRESSURE: 139 MMHG | WEIGHT: 126 LBS | TEMPERATURE: 98.1 F | OXYGEN SATURATION: 98 % | RESPIRATION RATE: 17 BRPM | BODY MASS INDEX: 25.4 KG/M2

## 2019-05-08 VITALS — DIASTOLIC BLOOD PRESSURE: 80 MMHG | SYSTOLIC BLOOD PRESSURE: 132 MMHG

## 2019-05-08 PROCEDURE — 99214 OFFICE O/P EST MOD 30 MIN: CPT

## 2019-05-08 PROCEDURE — 93000 ELECTROCARDIOGRAM COMPLETE: CPT

## 2019-05-08 NOTE — HISTORY OF PRESENT ILLNESS
[FreeTextEntry1] : 89 year old woman with hypertension, diabetes, anemia, moderate AS, HFPEF,  presents for a followup visit. \par \par Since her last visit she has been feeling well. She is complaining of her right arm feeling likely it goes numb intermittently. \par \par She   denies any chest pain, PND, orthopnea, lower extremity edema,   syncope, strokelike symptoms.  Her dyspnea on exertion has improved. Though she has been relatively sedentary, but plans to walk more with the nicer weather.  Her left arm pain has improved.   \par She is compliant with her medications. Though she is only taking Norvasc 5mg Qday. She never increased it to q12. \par \par She had an echo in 11/2018 that showed normal systolic LV function  with moderate AS (BETY 1 sqcm).  She had a pharmacological nuclear stress test unrevealing for ischemia on 4/171/9 .

## 2019-05-08 NOTE — DISCUSSION/SUMMARY
[FreeTextEntry1] : 89 year woman with a history as listed presents for a followup cardiac evaluation. \par \par Molly is doing much better. Her main compliant is of right arm numbness which appears related to her cervical neck. \par \par She had an echo in 11/2018 that showed normal systolic LV function  with moderate AS (BETY 1 sqcm).  She had a pharmacological nuclear stress test unrevealing for ischemia on 4/171/9 . Her EKG shows APCs. Her PVCs have stopped. \par \par Her blood pressure has improved greatly on just the addition on Toprol.  S  She will continue Lisinopril 20mg Qday. She will continue Toprol 50mg Qday and Norvasc 5mg Qday. In the future I would like to increase the Toprol to 100mg Qday and stop the norvasc. As she is alone now i will defer this till her family is with her to ensure no mix up with medications. \par \par Based on her echo in 11/2018 she has moderate to severe AS. This will need to be monitored over time. She has mild dyspnea on significant exertion. \par  \par She was noted to have mild carotid disease. She will continue her lipitor 20mg HS. Her LDL is at goal. Her goal LDL is <100. \par \par Given new data, she is not the best candidate to ASA as primary prevention. \par \par Exercise and diet counseling was performed in order to reduce her future cardiovascular risk. \par She will followup with me in 3 month or sooner if necessary. \par

## 2019-05-08 NOTE — REVIEW OF SYSTEMS
[see HPI] : see HPI [Dizziness] : dizziness [Dyspnea on exertion] : dyspnea during exertion [Negative] : Heme/Lymph

## 2019-05-29 DIAGNOSIS — M79.602 PAIN IN LEFT ARM: ICD-10-CM

## 2019-07-18 ENCOUNTER — APPOINTMENT (OUTPATIENT)
Dept: FAMILY MEDICINE | Facility: CLINIC | Age: 84
End: 2019-07-18
Payer: MEDICARE

## 2019-07-18 VITALS
RESPIRATION RATE: 16 BRPM | HEART RATE: 76 BPM | BODY MASS INDEX: 25.4 KG/M2 | DIASTOLIC BLOOD PRESSURE: 73 MMHG | OXYGEN SATURATION: 99 % | HEIGHT: 59 IN | SYSTOLIC BLOOD PRESSURE: 156 MMHG | WEIGHT: 126 LBS

## 2019-07-18 DIAGNOSIS — I49.3 VENTRICULAR PREMATURE DEPOLARIZATION: ICD-10-CM

## 2019-07-18 PROCEDURE — 99213 OFFICE O/P EST LOW 20 MIN: CPT

## 2019-07-18 RX ORDER — ASPIRIN ENTERIC COATED TABLETS 81 MG 81 MG/1
81 TABLET, DELAYED RELEASE ORAL DAILY
Refills: 0 | Status: COMPLETED | COMMUNITY
Start: 2018-05-16 | End: 2019-07-18

## 2019-07-19 NOTE — HISTORY OF PRESENT ILLNESS
[Family Member] : family member [de-identified] : Patient states she is feeling well. Underwent injections for both shoulders with good relief.\par

## 2019-07-19 NOTE — PLAN
[FreeTextEntry1] : Diabetes- obtain labs\par \par Hypertension- blood pressure not at goal however patient was not taking amlodipine twice daily as was prescribed by Dr. Goldman in March. Most recent consult indicates preference to increase toprol and eliminate amlodipine in the future. Heart rate at time of exam ranged from 45 to 76. At this time, advised patient to take amlodipine twice daily as prescribed. We will assess her blood pressure again during her follow up evaluation with Dr. Goldman next month.\par \par HCM- mammogram

## 2019-07-19 NOTE — PHYSICAL EXAM
[No Acute Distress] : no acute distress [Well Nourished] : well nourished [Well Developed] : well developed [Well-Appearing] : well-appearing [Normal Sclera/Conjunctiva] : normal sclera/conjunctiva [PERRL] : pupils equal round and reactive to light [Normal Outer Ear/Nose] : the outer ears and nose were normal in appearance [Normal Oropharynx] : the oropharynx was normal [Normal TMs] : both tympanic membranes were normal [No Lymphadenopathy] : no lymphadenopathy [No Respiratory Distress] : no respiratory distress  [No Accessory Muscle Use] : no accessory muscle use [Clear to Auscultation] : lungs were clear to auscultation bilaterally [Normal S1, S2] : normal S1 and S2 [Pedal Pulses Present] : the pedal pulses are present [No Edema] : there was no peripheral edema [Normal Supraclavicular Nodes] : no supraclavicular lymphadenopathy [Normal Posterior Cervical Nodes] : no posterior cervical lymphadenopathy [Normal Anterior Cervical Nodes] : no anterior cervical lymphadenopathy [No CVA Tenderness] : no CVA  tenderness [No Spinal Tenderness] : no spinal tenderness [Grossly Normal Strength/Tone] : grossly normal strength/tone [Normal Gait] : normal gait [Normal Affect] : the affect was normal [Normal Insight/Judgement] : insight and judgment were intact [de-identified] : PVC's

## 2019-08-14 ENCOUNTER — NON-APPOINTMENT (OUTPATIENT)
Age: 84
End: 2019-08-14

## 2019-08-14 ENCOUNTER — APPOINTMENT (OUTPATIENT)
Dept: CARDIOLOGY | Facility: CLINIC | Age: 84
End: 2019-08-14
Payer: MEDICARE

## 2019-08-14 VITALS
DIASTOLIC BLOOD PRESSURE: 69 MMHG | HEIGHT: 59 IN | OXYGEN SATURATION: 99 % | RESPIRATION RATE: 16 BRPM | WEIGHT: 126 LBS | HEART RATE: 81 BPM | BODY MASS INDEX: 25.4 KG/M2 | SYSTOLIC BLOOD PRESSURE: 123 MMHG

## 2019-08-14 PROCEDURE — 99214 OFFICE O/P EST MOD 30 MIN: CPT

## 2019-08-14 PROCEDURE — 93000 ELECTROCARDIOGRAM COMPLETE: CPT

## 2019-08-14 RX ORDER — ATORVASTATIN CALCIUM 20 MG/1
20 TABLET, FILM COATED ORAL
Qty: 90 | Refills: 1 | Status: DISCONTINUED | COMMUNITY
Start: 2018-05-31 | End: 2019-08-14

## 2019-08-14 NOTE — HISTORY OF PRESENT ILLNESS
[FreeTextEntry1] : 89 year old woman with hypertension, diabetes, anemia, moderate AS, HFPEF. \par She had an echo in 11/2018 that showed normal systolic LV function  with moderate AS (BETY 1 sqcm).  She had a pharmacological nuclear stress test unrevealing for ischemia on 4/171/9 . \par \par \par She presents for a followup visit. \par Since her last visit she has been feeling well. She is still having bilateral hand numbness. \par On review of her recent consultation wit Dr. Stallworth it appears that her blood pressure was elevated on her last visit and her Norvasc was increased to 5mg q12. \par \par She   denies any chest pain, PND, orthopnea, lower extremity edema,   syncope, strokelike symptoms. She notes that when she get up quickly she notes mild lightheadedness. \par  Her dyspnea on exertion has improved.  She denies any blurry vision, headaches or recent stroke like symptoms. \par She is compliant with her medications. She is not taking the Lipitor. \par

## 2019-08-14 NOTE — PHYSICAL EXAM
[Well Groomed] : well groomed [Normal Conjunctiva] : the conjunctiva exhibited no abnormalities [General Appearance - In No Acute Distress] : no acute distress [Eyelids - No Xanthelasma] : the eyelids demonstrated no xanthelasmas [Normal Oral Mucosa] : normal oral mucosa [No Oral Pallor] : no oral pallor [No Oral Cyanosis] : no oral cyanosis [Normal Jugular Venous A Waves Present] : normal jugular venous A waves present [No Jugular Venous Roth A Waves] : no jugular venous roth A waves [Normal Jugular Venous V Waves Present] : normal jugular venous V waves present [Respiration, Rhythm And Depth] : normal respiratory rhythm and effort [Exaggerated Use Of Accessory Muscles For Inspiration] : no accessory muscle use [Auscultation Breath Sounds / Voice Sounds] : lungs were clear to auscultation bilaterally [Abdomen Soft] : soft [Abdomen Tenderness] : non-tender [Abdomen Mass (___ Cm)] : no abdominal mass palpated [Abnormal Walk] : normal gait [Gait - Sufficient For Exercise Testing] : the gait was sufficient for exercise testing [Nail Clubbing] : no clubbing of the fingernails [Petechial Hemorrhages (___cm)] : no petechial hemorrhages [Cyanosis, Localized] : no localized cyanosis [Skin Color & Pigmentation] : normal skin color and pigmentation [] : no rash [No Venous Stasis] : no venous stasis [Skin Lesions] : no skin lesions [No Skin Ulcers] : no skin ulcer [No Xanthoma] : no  xanthoma was observed [Oriented To Time, Place, And Person] : oriented to person, place, and time [Mood] : the mood was normal [Affect] : the affect was normal [Normal Rate] : normal [No Anxiety] : not feeling anxious [Premature Beats] : regular with premature beats [Normal S1] : normal S1 [Normal S2] : normal S2 [S2 Diminished] : was diminished [III] : a grade 3 [Carotids] : the murmur was transmitted to the carotid arteries [Crescendo-Decrescendo] : crescendo-decrescendo [1+] : Carotid: right 1+ [Bruit] : no bruit heard [No Pitting Edema] : no pitting edema present

## 2019-08-14 NOTE — DISCUSSION/SUMMARY
[FreeTextEntry1] : 89 year woman with a history as listed presents for a followup cardiac evaluation. \par \par Molly is doing much better. She denies any anginal symptoms. Clinically she is euvolemic on exam. \par \par She had an echo in 11/2018 that showed normal systolic LV function  with moderate AS (BETY 1 sqcm).  She had a pharmacological nuclear stress test unrevealing for ischemia on 4/171/9 . Her EKG shows occasional APCs and PVCs which were previously noted.  \par \par Her blood pressure is now controlled. She will continue Lisinopril 20mg Qday. She will continue Toprol 50mg Qday and Norvasc 5mg Q12. \par \par Based on her echo in 11/2018 she has moderate to severe AS. This will need to be monitored over time.  \par  \par She was noted to have mild carotid disease. She has self discontinue her lipitor 20mg HS. Her LDL in 3/2019 was low. Therefore I think i will leave her off of it for now.\par \par Given new data, she is not the best candidate to ASA as primary prevention. \par \par Exercise and diet counseling was performed in order to reduce her future cardiovascular risk. \par She will followup with me in 3 month or sooner if necessary. \par

## 2019-09-09 ENCOUNTER — RX RENEWAL (OUTPATIENT)
Age: 84
End: 2019-09-09

## 2019-09-18 ENCOUNTER — APPOINTMENT (OUTPATIENT)
Dept: FAMILY MEDICINE | Facility: CLINIC | Age: 84
End: 2019-09-18
Payer: MEDICARE

## 2019-09-18 VITALS
OXYGEN SATURATION: 99 % | BODY MASS INDEX: 25.4 KG/M2 | HEART RATE: 72 BPM | SYSTOLIC BLOOD PRESSURE: 120 MMHG | DIASTOLIC BLOOD PRESSURE: 65 MMHG | WEIGHT: 126 LBS | HEIGHT: 59 IN | RESPIRATION RATE: 15 BRPM

## 2019-09-18 DIAGNOSIS — R20.2 PARESTHESIA OF SKIN: ICD-10-CM

## 2019-09-18 PROCEDURE — 99213 OFFICE O/P EST LOW 20 MIN: CPT

## 2019-09-23 NOTE — PHYSICAL EXAM
[Coordination Grossly Intact] : coordination grossly intact [Normal Gait] : normal gait [Normal] : no acute distress, well nourished, well developed and well-appearing [EOMI] : extraocular movements intact [PERRL] : pupils equal round and reactive to light [Normal Outer Ear/Nose] : the outer ears and nose were normal in appearance [Normal Oropharynx] : the oropharynx was normal [Normal TMs] : both tympanic membranes were normal [Supple] : supple [No JVD] : no jugular venous distention [No Accessory Muscle Use] : no accessory muscle use [No Respiratory Distress] : no respiratory distress  [Regular Rhythm] : with a regular rhythm [Normal Rate] : normal rate  [Clear to Auscultation] : lungs were clear to auscultation bilaterally [Normal S1, S2] : normal S1 and S2 [No Edema] : there was no peripheral edema [Normal Supraclavicular Nodes] : no supraclavicular lymphadenopathy [Normal Posterior Cervical Nodes] : no posterior cervical lymphadenopathy [Normal Anterior Cervical Nodes] : no anterior cervical lymphadenopathy [No Spinal Tenderness] : no spinal tenderness [Grossly Normal Strength/Tone] : grossly normal strength/tone [Speech Grossly Normal] : speech grossly normal [Memory Grossly Normal] : memory grossly normal [Normal Affect] : the affect was normal [Normal Mood] : the mood was normal [Normal Insight/Judgement] : insight and judgment were intact [de-identified] : murmur [de-identified] : CN II-XII grossly intacht; normal brachioradialis reflex, no weakness

## 2019-09-23 NOTE — PLAN
[FreeTextEntry1] : Paresthesias limited to right arm- suspect cervical disc disease or other nerve impingement. Will refer to neuro for nerve conduction studies. Consider MRI cervical spine. Patient advised to go to the ER immediately if she develops weakness or any progression in symptoms.

## 2019-09-23 NOTE — HISTORY OF PRESENT ILLNESS
[Family Member] : family member [FreeTextEntry8] : Patient presents with cc of right arm and hand tingling. First noticed it about 6 months ago but it became more frequent recently.\par Patient is able to move her hand but may experience some weakness at times. She states it gets better after she shakes her hand.  \par The symptoms are more noticeable in the mornings. \par She is not experiencing any symptoms now.  \par No neck pain, headache, change in speech or dizziness.\par Notes that she had experienced difficulty in her left arm that responded well to an injection in her shoulder.

## 2019-09-30 ENCOUNTER — MEDICATION RENEWAL (OUTPATIENT)
Age: 84
End: 2019-09-30

## 2019-10-22 ENCOUNTER — RX RENEWAL (OUTPATIENT)
Age: 84
End: 2019-10-22

## 2019-11-06 ENCOUNTER — NON-APPOINTMENT (OUTPATIENT)
Age: 84
End: 2019-11-06

## 2019-11-06 ENCOUNTER — APPOINTMENT (OUTPATIENT)
Dept: CARDIOLOGY | Facility: CLINIC | Age: 84
End: 2019-11-06
Payer: MEDICARE

## 2019-11-06 VITALS
HEIGHT: 59 IN | DIASTOLIC BLOOD PRESSURE: 74 MMHG | BODY MASS INDEX: 25.6 KG/M2 | HEART RATE: 66 BPM | SYSTOLIC BLOOD PRESSURE: 131 MMHG | WEIGHT: 127 LBS | OXYGEN SATURATION: 91 %

## 2019-11-06 DIAGNOSIS — R94.31 ABNORMAL ELECTROCARDIOGRAM [ECG] [EKG]: ICD-10-CM

## 2019-11-06 PROCEDURE — 99214 OFFICE O/P EST MOD 30 MIN: CPT

## 2019-11-06 PROCEDURE — 93000 ELECTROCARDIOGRAM COMPLETE: CPT

## 2019-11-06 NOTE — HISTORY OF PRESENT ILLNESS
[FreeTextEntry1] : 89 year old woman with hypertension, diabetes, anemia, moderate AS, HFPEF. \par She had an echo in 11/2018 that showed normal systolic LV function  with moderate AS (BETY 1 sqcm).  She had a pharmacological nuclear stress test unrevealing for ischemia on 4/171/9 . \par \par \par She presents for a followup visit. \par \par Since her last visit she was noted to have mild pedal edema and her Norvasc was decreased to 5mg Qday and her Lisinopril was increased 30mg Qday. Her edema has signficantly improved. \par \par Otherwise she is feeling well. She is complaining of a numbness in her right arm and having right shoulder pain. \par \par She   denies any chest pain, PND, orthopnea, syncope, strokelike symptoms. Her lightheadedness has improved but she is complaining more of an imbalance sensation. \par  Her dyspnea on exertion has improved.  She denies any blurry vision, headaches or recent stroke like symptoms. \par She is compliant with her medications.  \par

## 2019-11-06 NOTE — DISCUSSION/SUMMARY
[FreeTextEntry1] : 89 year woman with a history as listed presents for a followup cardiac evaluation. \par \par Molly is doing much better. She denies any anginal symptoms. Clinically she is euvolemic on exam. \par \par She had an echo in 11/2018 that showed normal systolic LV function  with moderate AS (BETY 1 sqcm).  She had a pharmacological nuclear stress test unrevealing for ischemia on 4/171/9 . Her EKG shows occasional APCs and PVCs which were previously noted.  \par \par Her blood pressure is now controlled. She will continue Lisinopril 30mg Qday. She will continue Toprol 50mg Qday and Norvasc 5mg Qday. \par \par Based on her echo in 11/2018 she has moderate to severe AS. This will need to be monitored over time.  \par  \par She was noted to have mild carotid disease. She has self discontinue her lipitor 20mg HS. Her LDL in 3/2019 was low. Therefore I think i will leave her off of it for now.\par \par Given new data, she is not the best candidate to ASA as primary prevention. \par \par Exercise and diet counseling was performed in order to reduce her future cardiovascular risk. \par She will followup with me in 3 month or sooner if necessary. \par

## 2019-11-06 NOTE — PHYSICAL EXAM
[Well Groomed] : well groomed [General Appearance - In No Acute Distress] : no acute distress [Normal Conjunctiva] : the conjunctiva exhibited no abnormalities [Eyelids - No Xanthelasma] : the eyelids demonstrated no xanthelasmas [Normal Oral Mucosa] : normal oral mucosa [No Oral Pallor] : no oral pallor [No Oral Cyanosis] : no oral cyanosis [Normal Jugular Venous A Waves Present] : normal jugular venous A waves present [Normal Jugular Venous V Waves Present] : normal jugular venous V waves present [No Jugular Venous Roth A Waves] : no jugular venous roth A waves [Respiration, Rhythm And Depth] : normal respiratory rhythm and effort [Exaggerated Use Of Accessory Muscles For Inspiration] : no accessory muscle use [Auscultation Breath Sounds / Voice Sounds] : lungs were clear to auscultation bilaterally [Abdomen Soft] : soft [Abdomen Tenderness] : non-tender [Abdomen Mass (___ Cm)] : no abdominal mass palpated [Abnormal Walk] : normal gait [Gait - Sufficient For Exercise Testing] : the gait was sufficient for exercise testing [Nail Clubbing] : no clubbing of the fingernails [Cyanosis, Localized] : no localized cyanosis [Petechial Hemorrhages (___cm)] : no petechial hemorrhages [Skin Color & Pigmentation] : normal skin color and pigmentation [] : no rash [No Venous Stasis] : no venous stasis [Skin Lesions] : no skin lesions [No Skin Ulcers] : no skin ulcer [No Xanthoma] : no  xanthoma was observed [Oriented To Time, Place, And Person] : oriented to person, place, and time [Affect] : the affect was normal [Mood] : the mood was normal [No Anxiety] : not feeling anxious [Normal Rate] : normal [Premature Beats] : regular with premature beats [Normal S1] : normal S1 [Normal S2] : normal S2 [S2 Diminished] : was diminished [III] : a grade 3 [Crescendo-Decrescendo] : crescendo-decrescendo [Carotids] : the murmur was transmitted to the carotid arteries [1+] : left 1+ [Bruit] : no bruit heard [No Pitting Edema] : no pitting edema present

## 2019-12-04 ENCOUNTER — APPOINTMENT (OUTPATIENT)
Dept: CARDIOLOGY | Facility: CLINIC | Age: 84
End: 2019-12-04
Payer: MEDICARE

## 2019-12-04 ENCOUNTER — MEDICATION RENEWAL (OUTPATIENT)
Age: 84
End: 2019-12-04

## 2019-12-04 PROCEDURE — 93306 TTE W/DOPPLER COMPLETE: CPT

## 2020-01-27 ENCOUNTER — APPOINTMENT (OUTPATIENT)
Dept: FAMILY MEDICINE | Facility: CLINIC | Age: 85
End: 2020-01-27

## 2020-02-10 ENCOUNTER — APPOINTMENT (OUTPATIENT)
Dept: FAMILY MEDICINE | Facility: CLINIC | Age: 85
End: 2020-02-10
Payer: MEDICARE

## 2020-02-10 VITALS
BODY MASS INDEX: 25.2 KG/M2 | WEIGHT: 125 LBS | RESPIRATION RATE: 16 BRPM | HEIGHT: 59 IN | DIASTOLIC BLOOD PRESSURE: 73 MMHG | TEMPERATURE: 97.8 F | OXYGEN SATURATION: 98 % | SYSTOLIC BLOOD PRESSURE: 127 MMHG

## 2020-02-10 DIAGNOSIS — H26.9 UNSPECIFIED CATARACT: ICD-10-CM

## 2020-02-10 PROCEDURE — 99214 OFFICE O/P EST MOD 30 MIN: CPT

## 2020-02-10 RX ORDER — ALLOPURINOL 100 MG/1
100 TABLET ORAL
Qty: 90 | Refills: 0 | Status: DISCONTINUED | COMMUNITY
Start: 2018-05-16 | End: 2020-02-10

## 2020-02-10 NOTE — HISTORY OF PRESENT ILLNESS
[FreeTextEntry8] : 89yo female presents for preoperative clearance for cataract surgery. She feels well today and has no complaints at this time. She follows with her cardiologist who she last saw in November for aortic stenosis and HTN. These are both stable at this time.

## 2020-02-10 NOTE — PLAN
[FreeTextEntry1] : 1) Cataracts: Pt is intermediate risk for a low risk procedure and medically optimized. EKG done in November.

## 2020-02-10 NOTE — PHYSICAL EXAM
[Normal] : soft, non-tender, non-distended, no masses palpated, no HSM and normal bowel sounds [de-identified] : + systolic murmur

## 2020-03-02 ENCOUNTER — RX RENEWAL (OUTPATIENT)
Age: 85
End: 2020-03-02

## 2020-05-06 ENCOUNTER — APPOINTMENT (OUTPATIENT)
Dept: FAMILY MEDICINE | Facility: CLINIC | Age: 85
End: 2020-05-06

## 2020-05-27 ENCOUNTER — RX RENEWAL (OUTPATIENT)
Age: 85
End: 2020-05-27

## 2020-08-17 ENCOUNTER — RX RENEWAL (OUTPATIENT)
Age: 85
End: 2020-08-17

## 2020-11-09 ENCOUNTER — NON-APPOINTMENT (OUTPATIENT)
Age: 85
End: 2020-11-09

## 2020-11-09 ENCOUNTER — APPOINTMENT (OUTPATIENT)
Dept: FAMILY MEDICINE | Facility: CLINIC | Age: 85
End: 2020-11-09

## 2020-11-09 DIAGNOSIS — R41.3 OTHER AMNESIA: ICD-10-CM

## 2020-12-08 ENCOUNTER — APPOINTMENT (OUTPATIENT)
Dept: NEUROLOGY | Facility: CLINIC | Age: 85
End: 2020-12-08
Payer: MEDICARE

## 2020-12-08 VITALS
DIASTOLIC BLOOD PRESSURE: 76 MMHG | SYSTOLIC BLOOD PRESSURE: 130 MMHG | TEMPERATURE: 97.5 F | HEIGHT: 59 IN | WEIGHT: 125 LBS | BODY MASS INDEX: 25.2 KG/M2 | HEART RATE: 66 BPM

## 2020-12-08 PROCEDURE — 99204 OFFICE O/P NEW MOD 45 MIN: CPT

## 2020-12-08 PROCEDURE — 99072 ADDL SUPL MATRL&STAF TM PHE: CPT

## 2020-12-08 NOTE — PHYSICAL EXAM
[Total Score ___ / 30] : the patient achieved a score of [unfilled] /30 [Date / Time ___ / 5] : date / time [unfilled] / 5 [Place ___ / 5] : place [unfilled] / 5 [Registration ___ / 3] : registration [unfilled] / 3 [Serial Sevens ___/5] : serial sevens [unfilled] / 5 [Naming 2 Objects ___ / 2] : naming two objects [unfilled] / 2 [Repeating a Sentence ___ / 1] : repeating a sentence [unfilled] / 1 [Writing a Sentence ___ / 1] : write sentence [unfilled] / 1 [3-stage Verbal Command ___ / 3] : three-stage verbal command [unfilled] / 3 [Written Command ___ / 1] : written command [unfilled] / 1 [Copy a Design ___ / 1] : copy a design [unfilled] / 1 [Recall ___ / 3] : recall [unfilled] / 3 [Person] : oriented to person [Place] : disoriented to place [Time] : oriented to time [Short Term Intact] : short term memory impaired [Span Intact] : the attention span was decreased [Concentration Intact] : a decrease in concentrating ability was observed [Visual Intact] : visual attention was ~T not ~L decreased [Naming Objects] : no difficulty naming common objects [Repeating Phrases] : no difficulty repeating a phrase [Writing A Sentence] : no difficulty writing a sentence [Fluency] : fluency intact [Comprehension] : comprehension intact [Reading] : reading intact [Current Events] : inadequate knowledge of current events [Cranial Nerves Optic (II)] : visual acuity intact bilaterally,  visual fields full to confrontation, pupils equal round and reactive to light [Cranial Nerves Oculomotor (III)] : extraocular motion intact [Cranial Nerves Trigeminal (V)] : facial sensation intact symmetrically [Cranial Nerves Facial (VII)] : face symmetrical [Cranial Nerves Vestibulocochlear (VIII)] : hearing was intact bilaterally [Cranial Nerves Glossopharyngeal (IX)] : tongue and palate midline [Cranial Nerves Accessory (XI - Cranial And Spinal)] : head turning and shoulder shrug symmetric [Cranial Nerves Hypoglossal (XII)] : there was no tongue deviation with protrusion [Motor Tone] : muscle tone was normal in all four extremities [Motor Strength] : muscle strength was normal in all four extremities [Involuntary Movements] : no involuntary movements were seen [Motor Handedness Right-Handed] : the patient is right hand dominant [Paresis Pronator Drift Right-Sided] : no pronator drift on the right [Paresis Pronator Drift Left-Sided] : no pronator drift on the left [Sensation Tactile Decrease] : light touch was intact [Proprioception] : proprioception was intact [Past-pointing] : there was no past-pointing [Dysdiadochokinesia Bilaterally] : not present [Coordination - Dysmetria Impaired Finger-to-Nose Bilateral] : not present [2+] : Patella left 2+ [1+] : Ankle jerk left 1+ [Plantar Reflex Right Only] : normal on the right [Plantar Reflex Left Only] : normal on the left [] : no respiratory distress [Respiration, Rhythm And Depth] : normal respiratory rhythm and effort [Exaggerated Use Of Accessory Muscles For Inspiration] : no accessory muscle use [Auscultation Breath Sounds / Voice Sounds] : lungs were clear to auscultation bilaterally [Heart Rate And Rhythm] : heart rate was normal and rhythm regular [Heart Sounds] : normal S1 and S2 [Heart Sounds Gallop] : no gallops [Murmurs] : no murmurs [Arterial Pulses Carotid] : carotid pulses were normal with no bruits [Edema] : there was no peripheral edema [No Spinal Tenderness] : no spinal tenderness [Abnormal Walk] : normal gait [Musculoskeletal - Swelling] : no joint swelling seen [Skin Color & Pigmentation] : normal skin color and pigmentation [Skin Turgor] : normal skin turgor

## 2020-12-08 NOTE — DISCUSSION/SUMMARY
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 Otherwise nonfocal examination.\par We'll request CT scan of the head without contrast, rule out any SOL, hydrocephalus.\par  An electroencephalograph.\par serum vitamin B12 and folate, TSH, RPR.\par Start donepezil 5 mg p.o. q.h.s. Discussed possible side effects.\par Return to office, 4-6 weeks.

## 2020-12-08 NOTE — HISTORY OF PRESENT ILLNESS
[FreeTextEntry1] : 89-year-old woman right-handed history of hypertension, accompanied by family member. Several months of worsening memory, inattentive, orbits conversation, where she places things.No history of head injury from her, no prior history of stroke or seizures. Patient lives with younger sister, who is 80, does not go, does not drive,she will dress herself, based, but otherwise dependent on others for assistance.\par No complaints of headache, does report occasional dizziness, especially when she leans forward. Short sensation of the room spinning. No loss of catheter, and trouble speaking, no change in vision, no facial asymmetry, no trouble swallowing. No unilateral weakness or numbness of the extremities. Denies recurrent falls, usually sleeps well. Good appetite. No recent illnesses or hospitalizations.\par

## 2020-12-21 ENCOUNTER — APPOINTMENT (OUTPATIENT)
Dept: FAMILY MEDICINE | Facility: CLINIC | Age: 85
End: 2020-12-21
Payer: MEDICARE

## 2020-12-21 VITALS
HEIGHT: 59 IN | BODY MASS INDEX: 23.79 KG/M2 | HEART RATE: 56 BPM | WEIGHT: 118 LBS | OXYGEN SATURATION: 99 % | SYSTOLIC BLOOD PRESSURE: 145 MMHG | TEMPERATURE: 96.8 F | DIASTOLIC BLOOD PRESSURE: 76 MMHG

## 2020-12-21 DIAGNOSIS — R59.0 LOCALIZED ENLARGED LYMPH NODES: ICD-10-CM

## 2020-12-21 PROCEDURE — 99072 ADDL SUPL MATRL&STAF TM PHE: CPT

## 2020-12-21 PROCEDURE — G0439: CPT

## 2020-12-21 RX ORDER — MULTIVIT-MIN/FOLIC/VIT K/LYCOP 400-300MCG
1000 TABLET ORAL DAILY
Refills: 0 | Status: ACTIVE | COMMUNITY
Start: 2020-12-21

## 2020-12-21 RX ORDER — VITAMIN K2 90 MCG
125 MCG CAPSULE ORAL
Refills: 0 | Status: ACTIVE | COMMUNITY
Start: 2020-12-21

## 2020-12-21 NOTE — HISTORY OF PRESENT ILLNESS
[Family Member] : family member [[employer] ] : provided by [employer]   [FreeTextEntry1] : [id number] [TWNoteComboBox1] : [place] [de-identified] : Patient presents for physical exam.\par Feels well. \par Continues to experience some right shoulder discomfort but her biggest concern is her memory.\par \par According to the patient's nephew, she saw Dr. Mendoza earlier this month, however the patient does not recall the visit.\par She was prescribed Aricept but has not taken it. \par F/u with neuro is scheduled. \par \par Otherwise, patient denies chest pain, shortness of breath or change in bowel habits. \par Occasional reflux relieved with kahlil seltzer.\par Occasional dizziness if getting up too quickly after laying down.\par \par Had flu shot

## 2020-12-21 NOTE — REVIEW OF SYSTEMS
[Memory Loss] : memory loss [Negative] : Psychiatric [FreeTextEntry3] : saw optho for eye exam recently  [FreeTextEntry7] : occasional reflux [FreeTextEntry9] : right shoulder pain

## 2020-12-21 NOTE — PLAN
[FreeTextEntry1] : HCM:\par obtain labs\par gyn eval\par had flu shot\par patient started vitamin d and c at the recommendation of her niece- check Vitamin D level to ensure level is not too high\par \par Hypertension:\par BP not at goal- will  be following up with Dr. Goldman\par May require dose adjustment, however if BP is stable, would like to avoid higher dose which may contribute to orthostatic symptoms\par \par Dementia:\par Patient will be following up with Dr. Mendoza\par Aricept prescription noted \par Advised family that it may be a good idea to obtain a labeled pill box to assist the patient with her medication administration\par \par Type 2 Diabetes:\par Check A1C, glucose and lipids\par up to date with heather

## 2020-12-23 ENCOUNTER — APPOINTMENT (OUTPATIENT)
Dept: NEUROLOGY | Facility: CLINIC | Age: 85
End: 2020-12-23
Payer: MEDICARE

## 2020-12-23 PROCEDURE — 95816 EEG AWAKE AND DROWSY: CPT

## 2020-12-23 PROCEDURE — 99072 ADDL SUPL MATRL&STAF TM PHE: CPT

## 2020-12-28 ENCOUNTER — APPOINTMENT (OUTPATIENT)
Dept: NEUROLOGY | Facility: CLINIC | Age: 85
End: 2020-12-28

## 2021-01-11 DIAGNOSIS — R79.9 ABNORMAL FINDING OF BLOOD CHEMISTRY, UNSPECIFIED: ICD-10-CM

## 2021-01-11 LAB
25(OH)D3 SERPL-MCNC: 65.7 NG/ML
ALBUMIN SERPL ELPH-MCNC: 4.8 G/DL
ALP BLD-CCNC: 79 U/L
ALT SERPL-CCNC: 11 U/L
ANION GAP SERPL CALC-SCNC: 12 MMOL/L
AST SERPL-CCNC: 21 U/L
BASOPHILS # BLD AUTO: 0.02 K/UL
BASOPHILS NFR BLD AUTO: 0.3 %
BILIRUB SERPL-MCNC: 0.4 MG/DL
BUN SERPL-MCNC: 30 MG/DL
CALCIUM SERPL-MCNC: 10.7 MG/DL
CHLORIDE SERPL-SCNC: 107 MMOL/L
CHOLEST SERPL-MCNC: 152 MG/DL
CO2 SERPL-SCNC: 23 MMOL/L
CREAT SERPL-MCNC: 1.47 MG/DL
EOSINOPHIL # BLD AUTO: 0.03 K/UL
EOSINOPHIL NFR BLD AUTO: 0.5 %
ESTIMATED AVERAGE GLUCOSE: 117 MG/DL
GLUCOSE SERPL-MCNC: 116 MG/DL
HBA1C MFR BLD HPLC: 5.7 %
HCT VFR BLD CALC: 39.3 %
HDLC SERPL-MCNC: 31 MG/DL
HGB BLD-MCNC: 12.5 G/DL
IMM GRANULOCYTES NFR BLD AUTO: 0.3 %
LDLC SERPL CALC-MCNC: 86 MG/DL
LYMPHOCYTES # BLD AUTO: 2.26 K/UL
LYMPHOCYTES NFR BLD AUTO: 34.7 %
MAN DIFF?: NORMAL
MCHC RBC-ENTMCNC: 31.3 PG
MCHC RBC-ENTMCNC: 31.8 GM/DL
MCV RBC AUTO: 98.5 FL
MONOCYTES # BLD AUTO: 0.66 K/UL
MONOCYTES NFR BLD AUTO: 10.1 %
NEUTROPHILS # BLD AUTO: 3.52 K/UL
NEUTROPHILS NFR BLD AUTO: 54.1 %
NONHDLC SERPL-MCNC: 121 MG/DL
PLATELET # BLD AUTO: 159 K/UL
POTASSIUM SERPL-SCNC: 5.6 MMOL/L
PROT SERPL-MCNC: 7.8 G/DL
RBC # BLD: 3.99 M/UL
RBC # FLD: 13 %
SODIUM SERPL-SCNC: 141 MMOL/L
TRIGL SERPL-MCNC: 173 MG/DL
TSH SERPL-ACNC: 2.73 UIU/ML
WBC # FLD AUTO: 6.51 K/UL

## 2021-01-15 ENCOUNTER — NON-APPOINTMENT (OUTPATIENT)
Age: 86
End: 2021-01-15

## 2021-01-20 ENCOUNTER — NON-APPOINTMENT (OUTPATIENT)
Age: 86
End: 2021-01-20

## 2021-01-26 ENCOUNTER — APPOINTMENT (OUTPATIENT)
Dept: NEUROLOGY | Facility: CLINIC | Age: 86
End: 2021-01-26

## 2021-01-27 ENCOUNTER — RX RENEWAL (OUTPATIENT)
Age: 86
End: 2021-01-27

## 2021-03-08 LAB
ALBUMIN SERPL ELPH-MCNC: 4.7 G/DL
ALP BLD-CCNC: 82 U/L
ALT SERPL-CCNC: 12 U/L
ANION GAP SERPL CALC-SCNC: 14 MMOL/L
AST SERPL-CCNC: 21 U/L
BILIRUB SERPL-MCNC: 0.3 MG/DL
BUN SERPL-MCNC: 28 MG/DL
CALCIUM SERPL-MCNC: 10.2 MG/DL
CALCIUM SERPL-MCNC: 10.2 MG/DL
CHLORIDE SERPL-SCNC: 103 MMOL/L
CHOLEST SERPL-MCNC: 184 MG/DL
CO2 SERPL-SCNC: 23 MMOL/L
CREAT SERPL-MCNC: 1.2 MG/DL
GLUCOSE SERPL-MCNC: 123 MG/DL
HDLC SERPL-MCNC: 35 MG/DL
LDLC SERPL CALC-MCNC: 113 MG/DL
NONHDLC SERPL-MCNC: 149 MG/DL
PARATHYROID HORMONE INTACT: 44 PG/ML
POTASSIUM SERPL-SCNC: 4.6 MMOL/L
PROT SERPL-MCNC: 7.4 G/DL
SODIUM SERPL-SCNC: 141 MMOL/L
TRIGL SERPL-MCNC: 178 MG/DL

## 2021-04-26 ENCOUNTER — RX RENEWAL (OUTPATIENT)
Age: 86
End: 2021-04-26

## 2021-05-12 ENCOUNTER — APPOINTMENT (OUTPATIENT)
Dept: FAMILY MEDICINE | Facility: CLINIC | Age: 86
End: 2021-05-12

## 2021-06-23 ENCOUNTER — APPOINTMENT (OUTPATIENT)
Dept: FAMILY MEDICINE | Facility: CLINIC | Age: 86
End: 2021-06-23

## 2021-07-08 ENCOUNTER — APPOINTMENT (OUTPATIENT)
Dept: FAMILY MEDICINE | Facility: CLINIC | Age: 86
End: 2021-07-08
Payer: MEDICARE

## 2021-07-08 VITALS
BODY MASS INDEX: 23.79 KG/M2 | TEMPERATURE: 97.1 F | RESPIRATION RATE: 16 BRPM | OXYGEN SATURATION: 98 % | SYSTOLIC BLOOD PRESSURE: 143 MMHG | WEIGHT: 118 LBS | DIASTOLIC BLOOD PRESSURE: 84 MMHG | HEIGHT: 59 IN | HEART RATE: 91 BPM

## 2021-07-08 DIAGNOSIS — D69.6 THROMBOCYTOPENIA, UNSPECIFIED: ICD-10-CM

## 2021-07-08 DIAGNOSIS — E11.9 TYPE 2 DIABETES MELLITUS W/OUT COMPLICATIONS: ICD-10-CM

## 2021-07-08 DIAGNOSIS — D64.9 ANEMIA, UNSPECIFIED: ICD-10-CM

## 2021-07-08 PROCEDURE — 93000 ELECTROCARDIOGRAM COMPLETE: CPT

## 2021-07-08 PROCEDURE — 99214 OFFICE O/P EST MOD 30 MIN: CPT | Mod: 25

## 2021-07-08 PROCEDURE — 99072 ADDL SUPL MATRL&STAF TM PHE: CPT

## 2021-07-08 NOTE — PLAN
[FreeTextEntry1] : Hypertension:\par BP slightly elevated but stable\par Continue current treatment\par Suspect intermittent swelling in feet may be secondary to amlodipine. There is no evidence of soft tissue swelling or edema on exam today and patient appears euvolemic.\par EKG- sinus rhythm with premature atrial complexes at 73 bpm; non-specific ST abnormality\par Patient is overdue for cardio follow up (recommended at prior visit) \par \par Type 2 Diabetes:\par Check CMP, A1C, lipids\par \par Anemia and thrombocytopenia:\par Check CBC\par \par Dementia:\par On Aricept- follow up with neuro

## 2021-07-08 NOTE — HISTORY OF PRESENT ILLNESS
[Family Member] : family member [de-identified] : Patient presents for follow up.\par She has been feeling well.\par Occasional pain and swelling in her feet as well as arthritis pain in her shoulders.\par Nephew reports that the medication prescribed for memory appears to be helping but patient does at time have difficulty expressing herself.

## 2021-07-08 NOTE — PHYSICAL EXAM
[Normal Sclera/Conjunctiva] : normal sclera/conjunctiva [No JVD] : no jugular venous distention [Normal] : no respiratory distress, lungs were clear to auscultation bilaterally and no accessory muscle use [Normal Rate] : normal rate  [Regular Rhythm] : with a regular rhythm [Normal S1, S2] : normal S1 and S2 [Pedal Pulses Present] : the pedal pulses are present [No Edema] : there was no peripheral edema [Coordination Grossly Intact] : coordination grossly intact [Normal Gait] : normal gait [Normal Affect] : the affect was normal [Normal Insight/Judgement] : insight and judgment were intact

## 2021-07-21 ENCOUNTER — LABORATORY RESULT (OUTPATIENT)
Age: 86
End: 2021-07-21

## 2021-07-22 DIAGNOSIS — N18.30 CHRONIC KIDNEY DISEASE, STAGE 3 UNSPECIFIED: ICD-10-CM

## 2021-07-22 DIAGNOSIS — D72.819 DECREASED WHITE BLOOD CELL COUNT, UNSPECIFIED: ICD-10-CM

## 2021-07-22 LAB
25(OH)D3 SERPL-MCNC: 36.8 NG/ML
ALBUMIN SERPL ELPH-MCNC: 4.4 G/DL
ALP BLD-CCNC: 85 U/L
ALT SERPL-CCNC: 15 U/L
ANION GAP SERPL CALC-SCNC: 12 MMOL/L
AST SERPL-CCNC: 29 U/L
BASOPHILS # BLD AUTO: 0 K/UL
BASOPHILS NFR BLD AUTO: 0 %
BILIRUB SERPL-MCNC: 0.6 MG/DL
BUN SERPL-MCNC: 13 MG/DL
CALCIUM SERPL-MCNC: 9.6 MG/DL
CHLORIDE SERPL-SCNC: 104 MMOL/L
CHOLEST SERPL-MCNC: 152 MG/DL
CO2 SERPL-SCNC: 23 MMOL/L
CREAT SERPL-MCNC: 1.35 MG/DL
EOSINOPHIL # BLD AUTO: 0 K/UL
EOSINOPHIL NFR BLD AUTO: 0 %
ESTIMATED AVERAGE GLUCOSE: 123 MG/DL
GLUCOSE SERPL-MCNC: 120 MG/DL
HBA1C MFR BLD HPLC: 5.9 %
HCT VFR BLD CALC: 39.2 %
HDLC SERPL-MCNC: 31 MG/DL
HGB BLD-MCNC: 13.1 G/DL
LDLC SERPL CALC-MCNC: 92 MG/DL
LYMPHOCYTES # BLD AUTO: 1.22 K/UL
LYMPHOCYTES NFR BLD AUTO: 39.6 %
MAN DIFF?: NORMAL
MCHC RBC-ENTMCNC: 31.5 PG
MCHC RBC-ENTMCNC: 33.4 GM/DL
MCV RBC AUTO: 94.2 FL
MONOCYTES # BLD AUTO: 0.29 K/UL
MONOCYTES NFR BLD AUTO: 9.5 %
NEUTROPHILS # BLD AUTO: 1.56 K/UL
NEUTROPHILS NFR BLD AUTO: 50.9 %
NONHDLC SERPL-MCNC: 121 MG/DL
PLATELET # BLD AUTO: 117 K/UL
POTASSIUM SERPL-SCNC: 4.2 MMOL/L
PROT SERPL-MCNC: 7 G/DL
RBC # BLD: 4.16 M/UL
RBC # FLD: 14.2 %
SODIUM SERPL-SCNC: 139 MMOL/L
TRIGL SERPL-MCNC: 147 MG/DL
TSH SERPL-ACNC: 2.01 UIU/ML
WBC # FLD AUTO: 3.07 K/UL

## 2021-08-03 ENCOUNTER — RX RENEWAL (OUTPATIENT)
Age: 86
End: 2021-08-03

## 2021-09-10 ENCOUNTER — RX RENEWAL (OUTPATIENT)
Age: 86
End: 2021-09-10

## 2021-10-15 ENCOUNTER — APPOINTMENT (OUTPATIENT)
Dept: NEUROLOGY | Facility: CLINIC | Age: 86
End: 2021-10-15
Payer: MEDICARE

## 2021-10-15 VITALS
HEIGHT: 59 IN | DIASTOLIC BLOOD PRESSURE: 74 MMHG | TEMPERATURE: 97.6 F | SYSTOLIC BLOOD PRESSURE: 114 MMHG | HEART RATE: 88 BPM | WEIGHT: 118 LBS | BODY MASS INDEX: 23.79 KG/M2

## 2021-10-15 PROCEDURE — 99214 OFFICE O/P EST MOD 30 MIN: CPT

## 2021-10-15 NOTE — HISTORY OF PRESENT ILLNESS
[FreeTextEntry1] : 89 year old woman with dementia, continues living with family, able to dress self, feed self, otherwise sedentary.Here with son, no new illnesses, no change in behavior, no hallucinations, no aggressive behavior.\par Ran out of Aricept a few weeks ago. FAmily notes more difficulty findings  words.\par \par

## 2021-10-15 NOTE — PHYSICAL EXAM
[General Appearance - Alert] : alert [General Appearance - In No Acute Distress] : in no acute distress [Person] : oriented to person [Place] : disoriented to place [Time] : oriented to time [Short Term Intact] : short term memory impaired [Span Intact] : the attention span was decreased [Concentration Intact] : a decrease in concentrating ability was observed [Visual Intact] : visual attention was ~T not ~L decreased [Naming Objects] : no difficulty naming common objects [Repeating Phrases] : difficulty repeating a phrase [Writing A Sentence] : no difficulty writing a sentence [Fluency] : fluency not intact [Comprehension] : comprehension intact [Reading] : reading intact [Current Events] : inadequate knowledge of current events [Total Score ___ / 30] : the patient achieved a score of [unfilled] /30 [Date / Time ___ / 5] : date / time [unfilled] / 5 [Registration ___ / 3] : registration [unfilled] / 3 [Serial Sevens ___/5] : serial sevens [unfilled] / 5 [Naming 2 Objects ___ / 2] : naming two objects [unfilled] / 2 [Repeating a Sentence ___ / 1] : repeating a sentence [unfilled] / 1 [3-stage Verbal Command ___ / 3] : three-stage verbal command [unfilled] / 3 [Written Command ___ / 1] : written command [unfilled] / 1 [Copy a Design ___ / 1] : copy a design [unfilled] / 1 [Cranial Nerves Optic (II)] : visual acuity intact bilaterally,  visual fields full to confrontation, pupils equal round and reactive to light [Cranial Nerves Oculomotor (III)] : extraocular motion intact [Cranial Nerves Trigeminal (V)] : facial sensation intact symmetrically [Cranial Nerves Facial (VII)] : face symmetrical [Cranial Nerves Vestibulocochlear (VIII)] : hearing was intact bilaterally [Cranial Nerves Glossopharyngeal (IX)] : tongue and palate midline [Cranial Nerves Accessory (XI - Cranial And Spinal)] : head turning and shoulder shrug symmetric [Cranial Nerves Hypoglossal (XII)] : there was no tongue deviation with protrusion [Motor Tone] : muscle tone was normal in all four extremities [Motor Strength] : muscle strength was normal in all four extremities [Involuntary Movements] : no involuntary movements were seen [Motor Handedness Right-Handed] : the patient is right hand dominant [Paresis Pronator Drift Right-Sided] : no pronator drift on the right [Paresis Pronator Drift Left-Sided] : no pronator drift on the left [Sensation Tactile Decrease] : light touch was intact [Proprioception] : proprioception was intact [Abnormal Walk] : normal gait [Past-pointing] : there was no past-pointing [Dysdiadochokinesia Bilaterally] : not present [Coordination - Dysmetria Impaired Finger-to-Nose Bilateral] : not present [2+] : Patella left 2+ [1+] : Ankle jerk left 1+ [Plantar Reflex Right Only] : normal on the right [Plantar Reflex Left Only] : normal on the left

## 2021-10-15 NOTE — REVIEW OF SYSTEMS
[Confused or Disoriented] : confusion [Memory Lapses or Loss] : memory loss [Decr. Concentrating Ability] : decreased concentrating ability [Difficulty with Language] : ~M difficulty with language [Changed Thought Patterns] : changed thought patterns [Repeating Questions] : repeated questioning about recent events [Arm Weakness] : no arm weakness [Hand Weakness] : no hand weakness [Leg Weakness] : no leg weakness [Difficulties in Speech] : speech difficulties [Numbness] : no numbness [Abnormal Sensation] : no abnormal sensation [Seizures] : no convulsions [Dizziness] : no dizziness [Lightheadedness] : no lightheadedness [Migraine Headache] : no migraine headache [Inability to Walk] : able to walk [Frequent Falls] : not falling [Negative] : Heme/Lymph

## 2021-10-15 NOTE — DISCUSSION/SUMMARY
[FreeTextEntry1] : 89-year-old woman history of Alzheimer type dementia, increasing difficulties, recently ran out of Aricept, we'll reinstitute it.\par Advised to start Namenda 5 mg once a day,titrate every week by 5 mg, till total of 10 mg twice a day.\par encourage physical activity, exercise.\par Return for reevaluation, six-month

## 2021-11-08 RX ORDER — MEMANTINE HYDROCHLORIDE 5 MG/1
5 TABLET, FILM COATED ORAL
Qty: 50 | Refills: 1 | Status: DISCONTINUED | COMMUNITY
Start: 2021-10-15 | End: 2021-11-08

## 2021-12-15 ENCOUNTER — NON-APPOINTMENT (OUTPATIENT)
Age: 86
End: 2021-12-15

## 2021-12-15 ENCOUNTER — APPOINTMENT (OUTPATIENT)
Dept: CARDIOLOGY | Facility: CLINIC | Age: 86
End: 2021-12-15
Payer: MEDICARE

## 2021-12-15 VITALS
HEIGHT: 59 IN | BODY MASS INDEX: 23.39 KG/M2 | HEART RATE: 66 BPM | SYSTOLIC BLOOD PRESSURE: 150 MMHG | OXYGEN SATURATION: 97 % | DIASTOLIC BLOOD PRESSURE: 74 MMHG | WEIGHT: 116 LBS

## 2021-12-15 PROCEDURE — 99214 OFFICE O/P EST MOD 30 MIN: CPT

## 2021-12-15 PROCEDURE — 93000 ELECTROCARDIOGRAM COMPLETE: CPT

## 2021-12-15 NOTE — CARDIOLOGY SUMMARY
[de-identified] : Sinus  Rhythm  - occasional PAC  [de-identified] : 11/1/18 noraml LV function moderate to severe AS 1.0sqcm.  [de-identified] : 2019 pharm normal spect  [___] : [unfilled]

## 2021-12-15 NOTE — HISTORY OF PRESENT ILLNESS
[FreeTextEntry1] : 89 year old woman with hypertension, diabetes, anemia, moderate AS, HFPEF. \par She had an echo in 11/2018 that showed normal systolic LV function  with moderate AS (BETY 1 sqcm).  She had a pharmacological nuclear stress test unrevealing for ischemia on 4/171/9 . \par \par She presents for a followup visit. \par \par Since her last visit she was having more dementia and started on Aricept. She has lost her appetite. \par \par She   denies any chest pain, PND, orthopnea, syncope, strokelike symptoms. Her lightheadedness has improved but she is complaining more of an imbalance sensation. \par  Her dyspnea on exertion has improved.  She denies any blurry vision, headaches or recent stroke like symptoms. \par She is compliant with her medications.  \par

## 2021-12-15 NOTE — REVIEW OF SYSTEMS
[Change in Appetite] : change in appetite [Memory Lapses Or Loss] : memory lapses or loss [Negative] : Heme/Lymph [FreeTextEntry8] : poor appetite

## 2021-12-15 NOTE — DISCUSSION/SUMMARY
[FreeTextEntry1] : 89 year woman with a history as listed presents for a followup cardiac evaluation. \par \par Molly now has more memory issues that seem to have been improving with medications.  She denies any anginal symptoms. Clinically she is euvolemic on exam. \par \par She had an echo in 11/2018 that showed normal systolic LV function  with moderate AS (BETY 1 sqcm).  She had a pharmacological nuclear stress test unrevealing for ischemia on 4/171/9 . Her EKG shows occasional APCs and PVCs which were previously noted.  She will repeat her echo. i will defer ischemic testing at this time given a lack of symptoms. \par \par Her blood pressure is now controlled. She will continue Lisinopril 30mg Qday. She will continue Toprol 50mg Qday and Norvasc 5mg Qday. \par \par \par She was noted to have mild carotid disease. She has self discontinue her lipitor 20mg HS. Her LDL in 3/2019 was low. Therefore I think i will leave her off of it for now.\par \par Given new data, she is not the best candidate to ASA as primary prevention. \par \par Exercise and diet counseling was performed in order to reduce her future cardiovascular risk. \par She will followup with me in 3 month or sooner if necessary. \par

## 2021-12-16 ENCOUNTER — NON-APPOINTMENT (OUTPATIENT)
Age: 86
End: 2021-12-16

## 2021-12-22 ENCOUNTER — APPOINTMENT (OUTPATIENT)
Dept: CARDIOLOGY | Facility: CLINIC | Age: 86
End: 2021-12-22

## 2022-01-04 ENCOUNTER — APPOINTMENT (OUTPATIENT)
Dept: FAMILY MEDICINE | Facility: CLINIC | Age: 87
End: 2022-01-04
Payer: MEDICARE

## 2022-01-04 VITALS
RESPIRATION RATE: 21 BRPM | BODY MASS INDEX: 23.79 KG/M2 | HEIGHT: 59 IN | OXYGEN SATURATION: 98 % | WEIGHT: 118 LBS | SYSTOLIC BLOOD PRESSURE: 149 MMHG | TEMPERATURE: 97.2 F | HEART RATE: 96 BPM | DIASTOLIC BLOOD PRESSURE: 85 MMHG

## 2022-01-04 PROCEDURE — 99213 OFFICE O/P EST LOW 20 MIN: CPT

## 2022-01-05 NOTE — HISTORY OF PRESENT ILLNESS
[FreeTextEntry1] : balances issues [de-identified] : Pt is here with several complaints today. Feels well. \par Patient states that when she goes from sitting/laying to standing she feels poor balance issues. \par Issues with sleeping. Unable to fall asleep, laying in bed for hours. Hasn't tried any thing. Takes about 1-1 hour to fall asleep, not every night but enough that she feels bothered by it. Denies feeling tired in the morning. Several naps during the day. Uses television to fall asleep. \par Poor appetite. Denies recent weight loss, difficulty swallowing, strange taste/smell to food. \par Increased urination at night. Denies hematuria, dysuria.

## 2022-01-12 ENCOUNTER — NON-APPOINTMENT (OUTPATIENT)
Age: 87
End: 2022-01-12

## 2022-02-09 ENCOUNTER — APPOINTMENT (OUTPATIENT)
Dept: FAMILY MEDICINE | Facility: CLINIC | Age: 87
End: 2022-02-09

## 2022-03-11 ENCOUNTER — RX RENEWAL (OUTPATIENT)
Age: 87
End: 2022-03-11

## 2022-04-29 ENCOUNTER — RX RENEWAL (OUTPATIENT)
Age: 87
End: 2022-04-29

## 2022-05-12 ENCOUNTER — RX RENEWAL (OUTPATIENT)
Age: 87
End: 2022-05-12

## 2022-07-06 ENCOUNTER — APPOINTMENT (OUTPATIENT)
Dept: FAMILY MEDICINE | Facility: CLINIC | Age: 87
End: 2022-07-06

## 2022-07-06 VITALS
HEIGHT: 59 IN | TEMPERATURE: 99.1 F | BODY MASS INDEX: 23.18 KG/M2 | DIASTOLIC BLOOD PRESSURE: 76 MMHG | HEART RATE: 63 BPM | SYSTOLIC BLOOD PRESSURE: 169 MMHG | OXYGEN SATURATION: 96 % | WEIGHT: 115 LBS | RESPIRATION RATE: 17 BRPM

## 2022-07-06 PROCEDURE — 99213 OFFICE O/P EST LOW 20 MIN: CPT

## 2022-07-06 NOTE — HISTORY OF PRESENT ILLNESS
[de-identified] : 89yo F w/PMHx of hypertension, aortic stenosis, dementia, CKD stage 3, presents to the office with dizzy spells, losing balance 4-5 months. Denies feeling faint or that the room is spinning. Reports it occurs mostly in the morning when she is going from sitting to standing position. She has to take her time getting up from the bed as she feels unsteady. Denies nausea and vomiting. She denies any medication changes. She is compliant with her medications. Reports she eats well and keeps well hydrated. Denies chest pain, palpitations, shortness of breath, fatigue, loss of appetite, fevers, chills, diarrhea, constipation, blood in her stool or urine. \par \par Patient follows with cardiologist Dr. Goldman, last appointment in January 2022. Does not appeat patient has gotten an echo since 2018.

## 2022-07-06 NOTE — PHYSICAL EXAM
[No Acute Distress] : no acute distress [Well Nourished] : well nourished [Well Developed] : well developed [Well-Appearing] : well-appearing [Normal Sclera/Conjunctiva] : normal sclera/conjunctiva [PERRL] : pupils equal round and reactive to light [EOMI] : extraocular movements intact [Normal Outer Ear/Nose] : the outer ears and nose were normal in appearance [Normal Oropharynx] : the oropharynx was normal [No JVD] : no jugular venous distention [No Lymphadenopathy] : no lymphadenopathy [Supple] : supple [Thyroid Normal, No Nodules] : the thyroid was normal and there were no nodules present [No Respiratory Distress] : no respiratory distress  [No Accessory Muscle Use] : no accessory muscle use [Clear to Auscultation] : lungs were clear to auscultation bilaterally [Normal Rate] : normal rate  [Regular Rhythm] : with a regular rhythm [No Carotid Bruits] : no carotid bruits [No Abdominal Bruit] : a ~M bruit was not heard ~T in the abdomen [No Varicosities] : no varicosities [Pedal Pulses Present] : the pedal pulses are present [No Edema] : there was no peripheral edema [No Palpable Aorta] : no palpable aorta [No Extremity Clubbing/Cyanosis] : no extremity clubbing/cyanosis [Soft] : abdomen soft [Non Tender] : non-tender [Non-distended] : non-distended [No Masses] : no abdominal mass palpated [No HSM] : no HSM [Normal Bowel Sounds] : normal bowel sounds [Normal Posterior Cervical Nodes] : no posterior cervical lymphadenopathy [Normal Anterior Cervical Nodes] : no anterior cervical lymphadenopathy [No CVA Tenderness] : no CVA  tenderness [No Spinal Tenderness] : no spinal tenderness [No Joint Swelling] : no joint swelling [Grossly Normal Strength/Tone] : grossly normal strength/tone [No Rash] : no rash [Coordination Grossly Intact] : coordination grossly intact [No Focal Deficits] : no focal deficits [Normal Gait] : normal gait [Deep Tendon Reflexes (DTR)] : deep tendon reflexes were 2+ and symmetric [Normal Affect] : the affect was normal [Normal Insight/Judgement] : insight and judgment were intact [de-identified] : Loud systolic murmur at RUSB

## 2022-07-06 NOTE — REVIEW OF SYSTEMS
[Dizziness] : dizziness [Memory Loss] : memory loss [Unsteady Walking] : ataxia [Negative] : Heme/Lymph

## 2022-07-06 NOTE — ASSESSMENT
[FreeTextEntry1] : 89yo F w/PMHx of hypertension, aortic stenosis, dementia, CKD stage 3, presents to the office with dizzy spells, losing balance 4-5 months. Occurs a few times weekly, most often upon waking/getting out of bed. Unlikely orthostatic, hydration, or vertigo related. Possibly secondary to advancement of aortic stenosis. Pt follows with cardio Dr. Goldman. \par - Recommend follow up with cardio and consider repeat TTE\par - Will get CBC, CMP, A1C, will call with results\par - F/u in 6 months or earlier if needed

## 2022-08-01 ENCOUNTER — RX RENEWAL (OUTPATIENT)
Age: 87
End: 2022-08-01

## 2022-09-13 ENCOUNTER — APPOINTMENT (OUTPATIENT)
Dept: CARDIOLOGY | Facility: CLINIC | Age: 87
End: 2022-09-13

## 2022-10-20 ENCOUNTER — RX RENEWAL (OUTPATIENT)
Age: 87
End: 2022-10-20

## 2022-11-30 ENCOUNTER — APPOINTMENT (OUTPATIENT)
Dept: NEUROLOGY | Facility: CLINIC | Age: 87
End: 2022-11-30

## 2022-12-09 ENCOUNTER — APPOINTMENT (OUTPATIENT)
Dept: NEUROLOGY | Facility: CLINIC | Age: 87
End: 2022-12-09

## 2022-12-09 VITALS
TEMPERATURE: 97.8 F | BODY MASS INDEX: 23.39 KG/M2 | HEIGHT: 59 IN | HEART RATE: 70 BPM | DIASTOLIC BLOOD PRESSURE: 77 MMHG | WEIGHT: 116 LBS | SYSTOLIC BLOOD PRESSURE: 131 MMHG

## 2022-12-09 DIAGNOSIS — R42 DIZZINESS AND GIDDINESS: ICD-10-CM

## 2022-12-09 DIAGNOSIS — R26.81 UNSTEADINESS ON FEET: ICD-10-CM

## 2022-12-09 PROCEDURE — 99213 OFFICE O/P EST LOW 20 MIN: CPT

## 2022-12-09 NOTE — DISCUSSION/SUMMARY
[FreeTextEntry1] : 89-year-old female returns to clinic accompanied by nephew for unstable gait and cognitive dysfunction.\par \par #Unstable gait\par #Cognitive dysfunction\par \par 1.  Issued Rx for physical therapy for gait and balance training\par 2.  Renewed donepezil 10 mg nightly #90 with 3 refills\par 3.  Renewed amantadine 10 mg every 12 hours #180 with 3 refills\par 4.  Return to clinic in 3 months for follow-up evaluation

## 2022-12-09 NOTE — REVIEW OF SYSTEMS
[Fever] : no fever [FreeTextEntry2] : Unobtainable from patient [de-identified] : Unobtainable from patient [de-identified] : Unobtainable from patient [FreeTextEntry3] : Unobtainable from patient [FreeTextEntry4] : Unobtainable from patient [FreeTextEntry5] : Unobtainable from patient [FreeTextEntry6] : Unobtainable from patient [FreeTextEntry7] : Unobtainable from patient [FreeTextEntry8] : Unobtainable from patient [FreeTextEntry9] : Unobtainable from patient [de-identified] : Unobtainable from patient [de-identified] : Unobtainable from patient [de-identified] : Unobtainable from patient

## 2022-12-09 NOTE — REASON FOR VISIT
[Follow-Up: _____] : a [unfilled] follow-up visit [Family Member] : family member [FreeTextEntry1] : Dizziness and mild cognitive dysfunction

## 2022-12-09 NOTE — HISTORY OF PRESENT ILLNESS
[FreeTextEntry1] : 89-year-old female returns to clinic for follow-up evaluation for dizziness and mild cognitive dysfunction she is accompanied with her nephew who states she is doing very well and has been no significant changes in her cognitive level, mood or sleep disturbances.  Patient is currently on donepezil 10 mg nightly and memantine 10 mg every 12 hours.  Nephew states has ran out of donepezil and needs refills on both medications.  Nephew does not know how long she has not been taking the donepezil.  Nephew denies any aggressive behavior, hallucinations either visual or verbal, dispenses medications for patient, patient is not allowed to leave home without being accompanied, patient is not allowed to use any appliances, and family shops for patient.

## 2022-12-09 NOTE — PHYSICAL EXAM
[General Appearance - Alert] : alert [Person] : disoriented to person [Place] : disoriented to place [Time] : disoriented to time [Short Term Intact] : short term memory impaired [Span Intact] : the attention span was decreased [Cranial Nerves Optic (II)] : visual acuity intact bilaterally,  visual fields full to confrontation, pupils equal round and reactive to light [Cranial Nerves Oculomotor (III)] : extraocular motion intact [Cranial Nerves Trigeminal (V)] : facial sensation intact symmetrically [Cranial Nerves Facial (VII)] : face symmetrical [Cranial Nerves Vestibulocochlear (VIII)] : hearing was intact bilaterally [Cranial Nerves Glossopharyngeal (IX)] : tongue and palate midline [Cranial Nerves Accessory (XI - Cranial And Spinal)] : head turning and shoulder shrug symmetric [Cranial Nerves Hypoglossal (XII)] : there was no tongue deviation with protrusion [Motor Tone] : muscle tone was normal in all four extremities [Motor Strength] : muscle strength was normal in all four extremities [Involuntary Movements] : no involuntary movements were seen [Motor Handedness Right-Handed] : the patient is right hand dominant [Paresis Pronator Drift Right-Sided] : no pronator drift on the right [Paresis Pronator Drift Left-Sided] : a left-sided pronator drift was present [Motor Strength Upper Extremities Bilaterally] : strength was normal in both upper extremities [Motor Strength Lower Extremities Bilaterally] : strength was normal in both lower extremities [Romberg's Sign] : a positive Romberg's sign was present [Coordination - Dysmetria Impaired Finger-to-Nose Bilateral] : present bilaterally [1+] : Ankle jerk left 1+ [FreeTextEntry8] : Patient unable to perform rapid alternating movements bilateral upper extremities, finger-to-nose was abnormal [PERRL With Normal Accommodation] : pupils were equal in size, round, reactive to light, with normal accommodation [Extraocular Movements] : extraocular movements were intact [Hearing Threshold Finger Rub Not [place]] : hearing was normal [Neck Appearance] : the appearance of the neck was normal [Neck Cervical Mass (___cm)] : no neck mass was observed [Auscultation Breath Sounds / Voice Sounds] : lungs were clear to auscultation bilaterally [Heart Rate And Rhythm] : heart rate was normal and rhythm regular [Heart Sounds] : normal S1 and S2 [Arterial Pulses Carotid] : carotid pulses were normal with no bruits [Abdomen Soft] : soft [FreeTextEntry1] : Gait demonstrated with mild instability to right [] : no rash

## 2023-01-31 ENCOUNTER — APPOINTMENT (OUTPATIENT)
Dept: CARDIOLOGY | Facility: CLINIC | Age: 88
End: 2023-01-31

## 2023-03-29 ENCOUNTER — APPOINTMENT (OUTPATIENT)
Dept: NEUROLOGY | Facility: CLINIC | Age: 88
End: 2023-03-29
Payer: MEDICARE

## 2023-03-29 VITALS
DIASTOLIC BLOOD PRESSURE: 82 MMHG | HEART RATE: 73 BPM | BODY MASS INDEX: 23.39 KG/M2 | WEIGHT: 116 LBS | SYSTOLIC BLOOD PRESSURE: 142 MMHG | HEIGHT: 59 IN

## 2023-03-29 DIAGNOSIS — F03.90 UNSPECIFIED DEMENTIA W/OUT BEHAVIORAL DISTURBANCE: ICD-10-CM

## 2023-03-29 PROCEDURE — 99213 OFFICE O/P EST LOW 20 MIN: CPT

## 2023-03-29 RX ORDER — DONEPEZIL HYDROCHLORIDE 10 MG/1
10 TABLET ORAL DAILY
Qty: 90 | Refills: 3 | Status: DISCONTINUED | COMMUNITY
Start: 2022-12-09 | End: 2023-03-29

## 2023-03-29 RX ORDER — LISINOPRIL 30 MG/1
30 TABLET ORAL DAILY
Qty: 90 | Refills: 0 | Status: DISCONTINUED | COMMUNITY
Start: 2018-11-02 | End: 2023-03-29

## 2023-03-29 RX ORDER — MEMANTINE HYDROCHLORIDE 10 MG/1
10 TABLET, FILM COATED ORAL
Qty: 60 | Refills: 0 | Status: DISCONTINUED | COMMUNITY
Start: 2021-11-08 | End: 2023-03-29

## 2023-03-29 NOTE — PHYSICAL EXAM
[General Appearance - Alert] : alert [General Appearance - In No Acute Distress] : in no acute distress [Affect] : the affect was normal [Mood] : the mood was normal [Cranial Nerves Optic (II)] : visual acuity intact bilaterally,  visual fields full to confrontation, pupils equal round and reactive to light [Cranial Nerves Oculomotor (III)] : extraocular motion intact [Cranial Nerves Trigeminal (V)] : facial sensation intact symmetrically [Cranial Nerves Facial (VII)] : face symmetrical [Cranial Nerves Vestibulocochlear (VIII)] : hearing was intact bilaterally [Cranial Nerves Glossopharyngeal (IX)] : tongue and palate midline [Cranial Nerves Accessory (XI - Cranial And Spinal)] : head turning and shoulder shrug symmetric [Cranial Nerves Hypoglossal (XII)] : there was no tongue deviation with protrusion [Sensation Tactile Decrease] : light touch was intact [Romberg's Sign] : a positive Romberg's sign was present [1+] : Patella left 1+ [PERRL With Normal Accommodation] : pupils were equal in size, round, reactive to light, with normal accommodation [Extraocular Movements] : extraocular movements were intact [Hearing Threshold Finger Rub Not [place]] : hearing was normal [Neck Appearance] : the appearance of the neck was normal [] : no respiratory distress [Exaggerated Use Of Accessory Muscles For Inspiration] : no accessory muscle use [Heart Rate And Rhythm] : heart rate was normal and rhythm regular [Heart Sounds] : normal S1 and S2 [Abdomen Soft] : soft [Involuntary Movements] : no involuntary movements were seen [Person] : disoriented to person [Place] : disoriented to place [Time] : disoriented to time [Tremor] : no tremor present [FreeTextEntry1] : Patient requires assistance with gait.

## 2023-03-29 NOTE — HISTORY OF PRESENT ILLNESS
[FreeTextEntry1] : 89-year-old female returns to clinic with son for follow-up evaluation for dementia without behavioral disturbances, dentia type is unspecified.  Patient currently on donepezil 10 mg nightly and memantine 10 mg every 12 hours son states she is awake alert without verbal/visual hallucinations but on rare occasions some behavior disturbance with regards to taking her medication.  Son also denies any signs and symptoms of depression and patient.  And when asked to point to either sad or happy face patient could not do that.  Patient has completed a course of physical therapy for gait and balance training.  Son also states the lipsmacking and expressive aphasia has existed for approximately 3 years there is been no progression and denies any dysphagia.

## 2023-03-29 NOTE — DISCUSSION/SUMMARY
[FreeTextEntry1] : 89-year-old female with history of dementia unspecified without behavioral disturbances currently on donepezil 10 mg nightly and memantine 10 mg every 12 hours is doing well no significant changes in baseline.\par \par #Dementia without behavioral disturbance, unspecified dementia type\par \par 1.  Continue donepezil 10 mg nightly\par 2.  Continue memantine 10 mg every 12 hours\par 3.  Recommendations include reading aloud such as Dr. Rodarte books, daily physical exercise\par 4.  Return to clinic 1 year or sooner if as needed

## 2023-03-29 NOTE — REVIEW OF SYSTEMS
[Confused or Disoriented] : confusion [Memory Lapses or Loss] : memory loss [Dizziness] : dizziness [Lightheadedness] : lightheadedness [Fever] : no fever [Chills] : no chills [Seizures] : no convulsions [Sleep Disturbances] : no sleep disturbances [Anxiety] : no anxiety [Depression] : no depression [Eye Pain] : no eye pain [Loss Of Hearing] : no hearing loss [Heart Rate Is Fast] : the heart rate was not fast [Chest Pain] : no chest pain [Palpitations] : no palpitations [Muscle Weakness] : no muscle weakness

## 2023-08-03 ENCOUNTER — RX RENEWAL (OUTPATIENT)
Age: 88
End: 2023-08-03

## 2023-10-04 ENCOUNTER — APPOINTMENT (OUTPATIENT)
Dept: FAMILY MEDICINE | Facility: CLINIC | Age: 88
End: 2023-10-04

## 2023-10-11 NOTE — ASSESSMENT
Per Dr. Bhatt refill request sent to patient's pharmacy [facility].   [FreeTextEntry1] : Sleep issues:\par Start melatonin 3-5mg qhs, 3 hours before going to bed. Improve sleep hygiene, decrease screen time before bed, stop napping during the day. \par \par Decreased appetite:\par Start ensure 3x/day \par No weight loss, continue to monitor\par \par Increased urination at night:\par use depends overnight\par F/u with urology \par No obvious signs of UTI \par \par Home aid\par face to face encounter done, referral placed for home care aid

## 2023-10-23 ENCOUNTER — RX RENEWAL (OUTPATIENT)
Age: 88
End: 2023-10-23

## 2023-11-07 ENCOUNTER — APPOINTMENT (OUTPATIENT)
Dept: CARDIOLOGY | Facility: CLINIC | Age: 88
End: 2023-11-07
Payer: MEDICARE

## 2023-11-07 VITALS
SYSTOLIC BLOOD PRESSURE: 144 MMHG | HEART RATE: 65 BPM | WEIGHT: 109 LBS | HEIGHT: 59 IN | BODY MASS INDEX: 21.97 KG/M2 | OXYGEN SATURATION: 96 % | DIASTOLIC BLOOD PRESSURE: 76 MMHG

## 2023-11-07 PROCEDURE — 93000 ELECTROCARDIOGRAM COMPLETE: CPT

## 2023-11-07 PROCEDURE — 99214 OFFICE O/P EST MOD 30 MIN: CPT | Mod: 25

## 2023-12-06 ENCOUNTER — RX RENEWAL (OUTPATIENT)
Age: 88
End: 2023-12-06

## 2023-12-27 ENCOUNTER — LABORATORY RESULT (OUTPATIENT)
Age: 88
End: 2023-12-27

## 2023-12-27 ENCOUNTER — NON-APPOINTMENT (OUTPATIENT)
Age: 88
End: 2023-12-27

## 2023-12-27 ENCOUNTER — APPOINTMENT (OUTPATIENT)
Dept: FAMILY MEDICINE | Facility: CLINIC | Age: 88
End: 2023-12-27
Payer: MEDICARE

## 2023-12-27 VITALS
HEART RATE: 53 BPM | DIASTOLIC BLOOD PRESSURE: 69 MMHG | OXYGEN SATURATION: 94 % | WEIGHT: 110 LBS | HEIGHT: 59 IN | TEMPERATURE: 98.1 F | SYSTOLIC BLOOD PRESSURE: 151 MMHG | BODY MASS INDEX: 22.18 KG/M2

## 2023-12-27 DIAGNOSIS — Z23 ENCOUNTER FOR IMMUNIZATION: ICD-10-CM

## 2023-12-27 DIAGNOSIS — Z00.00 ENCOUNTER FOR GENERAL ADULT MEDICAL EXAMINATION W/OUT ABNORMAL FINDINGS: ICD-10-CM

## 2023-12-27 DIAGNOSIS — I10 ESSENTIAL (PRIMARY) HYPERTENSION: ICD-10-CM

## 2023-12-27 DIAGNOSIS — I35.0 NONRHEUMATIC AORTIC (VALVE) STENOSIS: ICD-10-CM

## 2023-12-27 DIAGNOSIS — R01.1 CARDIAC MURMUR, UNSPECIFIED: ICD-10-CM

## 2023-12-27 PROCEDURE — G0439: CPT

## 2023-12-27 PROCEDURE — 90686 IIV4 VACC NO PRSV 0.5 ML IM: CPT

## 2023-12-27 PROCEDURE — G0008: CPT

## 2023-12-27 PROCEDURE — 93000 ELECTROCARDIOGRAM COMPLETE: CPT

## 2023-12-27 RX ORDER — METOPROLOL SUCCINATE 50 MG/1
50 TABLET, EXTENDED RELEASE ORAL
Qty: 90 | Refills: 2 | Status: ACTIVE | OUTPATIENT
Start: 2019-03-19

## 2023-12-27 NOTE — PHYSICAL EXAM
[No Acute Distress] : no acute distress [Well Nourished] : well nourished [Well Developed] : well developed [Well-Appearing] : well-appearing [Normal Sclera/Conjunctiva] : normal sclera/conjunctiva [PERRL] : pupils equal round and reactive to light [Supple] : supple [Thyroid Normal, No Nodules] : the thyroid was normal and there were no nodules present [No Respiratory Distress] : no respiratory distress  [No Accessory Muscle Use] : no accessory muscle use [Clear to Auscultation] : lungs were clear to auscultation bilaterally [Normal Rate] : normal rate  [Regular Rhythm] : with a regular rhythm [Soft] : abdomen soft [Non Tender] : non-tender [Normal Bowel Sounds] : normal bowel sounds [No Joint Swelling] : no joint swelling [No Rash] : no rash [Speech Grossly Normal] : speech grossly normal [Normal Affect] : the affect was normal [Normal Mood] : the mood was normal [de-identified] : S2 systolic murmur

## 2023-12-27 NOTE — ASSESSMENT
[FreeTextEntry1] : # CPE  - Flu shot  - Bloodwork today  - EKG with no acute findings, HR 52 - will f/u with Dr. Goldman   #Bradycardia  - HR in office and EKG 52- patient asymptomatic  - Patient to f/u with ADIEL Goldman this week to discuss bradycardia and Metoprolol   # CAD  She was noted to have mild carotid disease. She had self discontinued her statin.  Exercise and diet counseling was performed in order to reduce her future cardiovascular risk. She will followup with me in 6-12 month or sooner if necessary.  # AS - + systolic murmur  - Repeat ECHO rec per cardio   # HTN - BP  controlled - continue Amlodipine 5mg Qday and Norvasc 5mg Qday. Hold Toprol 50mg today and discuss with Dr. Goldman.  # Dementia  - continue home  Donepezil and Memantine

## 2023-12-27 NOTE — HEALTH RISK ASSESSMENT
[Good] : ~his/her~  mood as  good [No] : No [No falls in past year] : Patient reported no falls in the past year [0] : 2) Feeling down, depressed, or hopeless: Not at all (0) [With Family] : lives with family [Retired] : retired [Independent] : walking [Some assistance needed] : feeding [Never] : Never [PHQ-2 Negative - No further assessment needed] : PHQ-2 Negative - No further assessment needed [[id number]] : 0 [Change in mental status noted] : No change in mental status noted [MammogramComments] : Patient older than age requirement  [PapSmearComments] : Patient older than age requirement  [BoneDensityComments] : Patient older than age requirement  [ColonoscopyComments] : Patient older than age requirement  [FreeTextEntry4] : Patient said she will discuss with family and inform office

## 2023-12-27 NOTE — HISTORY OF PRESENT ILLNESS
[FreeTextEntry1] : CPE  [de-identified] : Patient is a 89 yo female with PMHx HTN, CAD presenting for CPE. Patient follows with cardio Dr. Goldman and has a repeat echo scheduled. Patient states she is doing well. Denies having any colonoscopy or mammogram in the past. Requesting flu shot.

## 2023-12-28 LAB
ALBUMIN SERPL ELPH-MCNC: 4.7 G/DL
ALP BLD-CCNC: 96 U/L
ALT SERPL-CCNC: 21 U/L
ANION GAP SERPL CALC-SCNC: 15 MMOL/L
AST SERPL-CCNC: 32 U/L
BASOPHILS # BLD AUTO: 0.03 K/UL
BASOPHILS NFR BLD AUTO: 0.4 %
BILIRUB SERPL-MCNC: 0.5 MG/DL
BUN SERPL-MCNC: 19 MG/DL
CALCIUM SERPL-MCNC: 10.3 MG/DL
CHLORIDE SERPL-SCNC: 102 MMOL/L
CHOLEST SERPL-MCNC: 207 MG/DL
CO2 SERPL-SCNC: 25 MMOL/L
CREAT SERPL-MCNC: 1.12 MG/DL
EGFR: 46 ML/MIN/1.73M2
EOSINOPHIL # BLD AUTO: 0.11 K/UL
EOSINOPHIL NFR BLD AUTO: 1.5 %
ESTIMATED AVERAGE GLUCOSE: 114 MG/DL
GLUCOSE SERPL-MCNC: 118 MG/DL
HBA1C MFR BLD HPLC: 5.6 %
HCT VFR BLD CALC: 39 %
HDLC SERPL-MCNC: 42 MG/DL
HGB BLD-MCNC: 13 G/DL
IMM GRANULOCYTES NFR BLD AUTO: 0.7 %
LDLC SERPL CALC-MCNC: 132 MG/DL
LYMPHOCYTES # BLD AUTO: 3.02 K/UL
LYMPHOCYTES NFR BLD AUTO: 41 %
MAN DIFF?: NORMAL
MCHC RBC-ENTMCNC: 32.3 PG
MCHC RBC-ENTMCNC: 33.3 GM/DL
MCV RBC AUTO: 96.8 FL
MONOCYTES # BLD AUTO: 0.51 K/UL
MONOCYTES NFR BLD AUTO: 6.9 %
NEUTROPHILS # BLD AUTO: 3.64 K/UL
NEUTROPHILS NFR BLD AUTO: 49.5 %
NONHDLC SERPL-MCNC: 165 MG/DL
PLATELET # BLD AUTO: 179 K/UL
POTASSIUM SERPL-SCNC: 5 MMOL/L
PROT SERPL-MCNC: 7.6 G/DL
RBC # BLD: 4.03 M/UL
RBC # FLD: 14 %
SODIUM SERPL-SCNC: 141 MMOL/L
TRIGL SERPL-MCNC: 183 MG/DL
TSH SERPL-ACNC: 7.47 UIU/ML
WBC # FLD AUTO: 7.36 K/UL

## 2024-02-12 ENCOUNTER — APPOINTMENT (OUTPATIENT)
Dept: NEUROLOGY | Facility: CLINIC | Age: 89
End: 2024-02-12

## 2024-02-19 ENCOUNTER — RX RENEWAL (OUTPATIENT)
Age: 89
End: 2024-02-19

## 2024-03-18 ENCOUNTER — RX RENEWAL (OUTPATIENT)
Age: 89
End: 2024-03-18

## 2024-03-18 RX ORDER — AMLODIPINE BESYLATE 5 MG/1
5 TABLET ORAL
Qty: 180 | Refills: 0 | Status: ACTIVE | COMMUNITY
Start: 2018-10-24 | End: 1900-01-01

## 2024-03-21 DIAGNOSIS — G30.1 ALZHEIMER'S DISEASE WITH LATE ONSET: ICD-10-CM

## 2024-03-21 DIAGNOSIS — F02.80 ALZHEIMER'S DISEASE WITH LATE ONSET: ICD-10-CM

## 2024-05-17 ENCOUNTER — APPOINTMENT (OUTPATIENT)
Dept: NEUROLOGY | Facility: CLINIC | Age: 89
End: 2024-05-17
Payer: MEDICARE

## 2024-05-17 PROCEDURE — G2211 COMPLEX E/M VISIT ADD ON: CPT

## 2024-05-17 PROCEDURE — 99214 OFFICE O/P EST MOD 30 MIN: CPT

## 2024-05-17 RX ORDER — DONEPEZIL HYDROCHLORIDE 10 MG/1
10 TABLET ORAL
Qty: 90 | Refills: 1 | Status: ACTIVE | COMMUNITY
Start: 2020-12-08 | End: 1900-01-01

## 2024-05-17 RX ORDER — MEMANTINE HYDROCHLORIDE 10 MG/1
10 TABLET, FILM COATED ORAL TWICE DAILY
Qty: 180 | Refills: 2 | Status: ACTIVE | COMMUNITY
Start: 2022-12-09 | End: 1900-01-01

## 2024-05-17 NOTE — REVIEW OF SYSTEMS
[Confused or Disoriented] : confusion [Memory Lapses or Loss] : memory loss [Decr. Concentrating Ability] : decreased concentrating ability [Difficulty with Language] : ~M difficulty with language [Changed Thought Patterns] : changed thought patterns [Repeating Questions] : repeated questioning about recent events [Facial Weakness] : no facial weakness [Arm Weakness] : no arm weakness [Hand Weakness] : no hand weakness [Leg Weakness] : no leg weakness [Poor Coordination] : good coordination [Difficulty Writing] : no difficulty writing [Dizziness] : no dizziness [Fainting] : no fainting [Difficulty Walking] : no difficulty walking [Frequent Falls] : not falling [Negative] : Heme/Lymph

## 2024-05-17 NOTE — REASON FOR VISIT
[Home] : at home, [unfilled] , at the time of the visit. [Medical Office: ([facility])___] : at the medical office located in  [Family Member] : family member [Patient] : the patient [Follow-Up: _____] : a [unfilled] follow-up visit

## 2024-05-17 NOTE — DISCUSSION/SUMMARY
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  Appears stable in her condition. Progressing slowly. Tolerating donepezil 10 mg in the evening and memantine 10 mg twice a day. Reviewed and discussed treatment.  No change at this time. Patient now living alone, high risk for falls, requiring greater supervision by family that does not live with her. Would benefit from home health aide or placement. Return to the office, 6 months.

## 2024-05-17 NOTE — PHYSICAL EXAM
[General Appearance - Alert] : alert [General Appearance - In No Acute Distress] : in no acute distress [General Appearance - Well Nourished] : well nourished [General Appearance - Well Developed] : well developed [General Appearance - Well-Appearing] : healthy appearing [] : normal voice and communication [Affect] : the affect was normal [Mood] : the mood was normal [Over the Past 2 Weeks, Have You Felt Down, Depressed, or Hopeless?] : 1.) Over the past 2 weeks, have you felt down, depressed, or hopeless? No [Over the Past 2 Weeks, Have You Felt Little Interest or Pleasure Doing Things?] : 2.) Over the past 2 weeks, have you felt little interest or pleasure doing things? No [Person] : oriented to person [Place] : disoriented to place [Total Score ___ / 30] : the patient achieved a score of [unfilled] /30 [Date / Time ___ / 5] : date / time [unfilled] / 5 [Place ___ / 5] : place [unfilled] / 5 [Registration ___ / 3] : registration [unfilled] / 3 [Serial Sevens ___/5] : serial sevens [unfilled] / 5 [Naming 2 Objects ___ / 2] : naming two objects [unfilled] / 2 [Repeating a Sentence ___ / 1] : repeating a sentence [unfilled] / 1 [3-stage Verbal Command ___ / 3] : three-stage verbal command [unfilled] / 3 [Written Command ___ / 1] : written command [unfilled] / 1 [Copy a Design ___ / 1] : copy a design [unfilled] / 1 [Recall ___ / 3] : recall [unfilled] / 3 [Cranial Nerves Optic (II)] : visual acuity intact bilaterally,  visual fields full to confrontation, pupils equal round and reactive to light [Cranial Nerves Oculomotor (III)] : extraocular motion intact [Cranial Nerves Trigeminal (V)] : facial sensation intact symmetrically [Cranial Nerves Facial (VII)] : face symmetrical [Cranial Nerves Vestibulocochlear (VIII)] : hearing was intact bilaterally [Cranial Nerves Accessory (XI - Cranial And Spinal)] : head turning and shoulder shrug symmetric [Cranial Nerves Hypoglossal (XII)] : there was no tongue deviation with protrusion [Motor Strength] : muscle strength was normal in all four extremities [Motor Handedness Right-Handed] : the patient is right hand dominant [Paresis Pronator Drift Right-Sided] : no pronator drift on the right [Paresis Pronator Drift Left-Sided] : no pronator drift on the left [Sensation Tactile Decrease] : light touch was intact [Abnormal Walk] : normal gait [Dysdiadochokinesia Bilaterally] : not present [Coordination - Dysmetria Impaired Finger-to-Nose Bilateral] : not present

## 2024-05-17 NOTE — HISTORY OF PRESENT ILLNESS
[FreeTextEntry1] : 89-year-old woman with a history of hypertension, coronary artery disease, dementia for follow-up visit.  Last time seen in the office 2022. Her nephew was in attendance with the visit.  Patient reports has been doing fairly well, denies any significant discomfort, has been dealing with left shoulder pain which is evaluated by orthopedics, getting physical therapy and has improved. Denies any headache, no dizzy spells, no recent falls or injuries.  No lightheadedness or dizziness.  Reports she is sleeping well, has a good appetite.  No significant weight changes. Remains forgetful, will repeat her questioning, will repeat her statements confused as to time and symptoms place.  No reported hallucinations or aggressive behavior. She is able to dress herself, able to take care of some of the immediate needs to go to the bathroom.  The family help preparing food, she can use a microwave and heated.  The family also make sure she takes her medications, the foot and out for her, and review that she is being compliant with it. They do the shopping, and help with the maintenance of the home. The family have noted a progressive deterioration in her recollection, memory.  Requiring more assistance from them. Patient in the past was living with a sister, now  and now is living alone.  The family takes turns and helping her but most of the time she spends alone especially in the evening. The family been trying to get her some assistance at home.  They are waiting for results of an application.

## 2024-08-12 ENCOUNTER — RX RENEWAL (OUTPATIENT)
Age: 89
End: 2024-08-12

## 2024-09-04 ENCOUNTER — RX RENEWAL (OUTPATIENT)
Age: 89
End: 2024-09-04

## 2024-09-10 ENCOUNTER — NON-APPOINTMENT (OUTPATIENT)
Age: 89
End: 2024-09-10

## 2024-10-18 ENCOUNTER — APPOINTMENT (OUTPATIENT)
Dept: DERMATOLOGY | Facility: CLINIC | Age: 89
End: 2024-10-18

## 2024-11-15 ENCOUNTER — APPOINTMENT (OUTPATIENT)
Dept: NEUROLOGY | Facility: CLINIC | Age: 89
End: 2024-11-15

## 2024-11-27 ENCOUNTER — APPOINTMENT (OUTPATIENT)
Dept: CARDIOLOGY | Facility: CLINIC | Age: 89
End: 2024-11-27

## 2024-11-29 ENCOUNTER — RX RENEWAL (OUTPATIENT)
Age: 89
End: 2024-11-29

## 2024-12-02 ENCOUNTER — RX RENEWAL (OUTPATIENT)
Age: 88
End: 2024-12-02

## 2024-12-16 NOTE — PHYSICAL EXAM
[place]    Caller: [place] - spouse    Topic/issue: Patients spouse called and asked if the prescription for apixaban (ELIQUIS) 2.5mg Tab can be changed and sent to [facility] due to the cost.     Please advise.     Callback Number: [phone]    Thank you,     Joelle PANTOJA     [Well Groomed] : well groomed [General Appearance - In No Acute Distress] : no acute distress [Normal Conjunctiva] : the conjunctiva exhibited no abnormalities [Normal Oral Mucosa] : normal oral mucosa [Eyelids - No Xanthelasma] : the eyelids demonstrated no xanthelasmas [No Oral Pallor] : no oral pallor [No Oral Cyanosis] : no oral cyanosis [Normal Jugular Venous V Waves Present] : normal jugular venous V waves present [Normal Jugular Venous A Waves Present] : normal jugular venous A waves present [No Jugular Venous Roth A Waves] : no jugular venous roth A waves [Auscultation Breath Sounds / Voice Sounds] : lungs were clear to auscultation bilaterally [Exaggerated Use Of Accessory Muscles For Inspiration] : no accessory muscle use [Respiration, Rhythm And Depth] : normal respiratory rhythm and effort [Abdomen Soft] : soft [Abdomen Tenderness] : non-tender [Abnormal Walk] : normal gait [Abdomen Mass (___ Cm)] : no abdominal mass palpated [Cyanosis, Localized] : no localized cyanosis [Nail Clubbing] : no clubbing of the fingernails [Gait - Sufficient For Exercise Testing] : the gait was sufficient for exercise testing [Petechial Hemorrhages (___cm)] : no petechial hemorrhages [Skin Color & Pigmentation] : normal skin color and pigmentation [] : no rash [No Venous Stasis] : no venous stasis [Skin Lesions] : no skin lesions [No Skin Ulcers] : no skin ulcer [Oriented To Time, Place, And Person] : oriented to person, place, and time [No Xanthoma] : no  xanthoma was observed [Affect] : the affect was normal [No Anxiety] : not feeling anxious [Mood] : the mood was normal [Normal Rate] : normal [Premature Beats] : regular with premature beats [Normal S1] : normal S1 [Normal S2] : normal S2 [S2 Diminished] : was diminished [III] : a grade 3 [Crescendo-Decrescendo] : crescendo-decrescendo [Carotids] : the murmur was transmitted to the carotid arteries [1+] : left 1+ [No Pitting Edema] : no pitting edema present [Bruit] : no bruit heard

## 2025-02-04 ENCOUNTER — APPOINTMENT (OUTPATIENT)
Dept: FAMILY MEDICINE | Facility: CLINIC | Age: 89
End: 2025-02-04

## 2025-02-04 ENCOUNTER — NON-APPOINTMENT (OUTPATIENT)
Age: 89
End: 2025-02-04

## 2025-02-04 VITALS
TEMPERATURE: 98.4 F | OXYGEN SATURATION: 98 % | HEART RATE: 74 BPM | BODY MASS INDEX: 24.39 KG/M2 | WEIGHT: 121 LBS | HEIGHT: 59 IN

## 2025-02-04 VITALS — DIASTOLIC BLOOD PRESSURE: 67 MMHG | SYSTOLIC BLOOD PRESSURE: 151 MMHG

## 2025-02-04 DIAGNOSIS — Z87.39 PERSONAL HISTORY OF OTHER DISEASES OF THE MUSCULOSKELETAL SYSTEM AND CONNECTIVE TISSUE: ICD-10-CM

## 2025-02-04 DIAGNOSIS — I10 ESSENTIAL (PRIMARY) HYPERTENSION: ICD-10-CM

## 2025-02-04 DIAGNOSIS — F03.90 UNSPECIFIED DEMENTIA W/OUT BEHAVIORAL DISTURBANCE: ICD-10-CM

## 2025-02-04 DIAGNOSIS — Z13.220 ENCOUNTER FOR SCREENING FOR LIPOID DISORDERS: ICD-10-CM

## 2025-02-04 DIAGNOSIS — Z00.00 ENCOUNTER FOR GENERAL ADULT MEDICAL EXAMINATION W/OUT ABNORMAL FINDINGS: ICD-10-CM

## 2025-02-04 DIAGNOSIS — Z13.1 ENCOUNTER FOR SCREENING FOR DIABETES MELLITUS: ICD-10-CM

## 2025-02-04 DIAGNOSIS — R06.09 OTHER FORMS OF DYSPNEA: ICD-10-CM

## 2025-02-04 PROCEDURE — G0439: CPT

## 2025-02-04 PROCEDURE — 99212 OFFICE O/P EST SF 10 MIN: CPT | Mod: 25

## 2025-02-04 PROCEDURE — 93000 ELECTROCARDIOGRAM COMPLETE: CPT

## 2025-02-04 RX ORDER — ELECTROLYTES/DEXTROSE
SOLUTION, ORAL ORAL
Refills: 0 | Status: ACTIVE | COMMUNITY
Start: 2025-02-04

## 2025-02-12 ENCOUNTER — RX RENEWAL (OUTPATIENT)
Age: 89
End: 2025-02-12

## 2025-02-14 ENCOUNTER — RX RENEWAL (OUTPATIENT)
Age: 89
End: 2025-02-14

## 2025-02-14 LAB
ALBUMIN SERPL ELPH-MCNC: 4.5 G/DL
ALP BLD-CCNC: 101 U/L
ALT SERPL-CCNC: 20 U/L
ANION GAP SERPL CALC-SCNC: 11 MMOL/L
AST SERPL-CCNC: 29 U/L
BASOPHILS # BLD AUTO: 0.02 K/UL
BASOPHILS NFR BLD AUTO: 0.4 %
BILIRUB SERPL-MCNC: 0.3 MG/DL
BUN SERPL-MCNC: 26 MG/DL
CALCIUM SERPL-MCNC: 9.8 MG/DL
CHLORIDE SERPL-SCNC: 106 MMOL/L
CHOLEST SERPL-MCNC: 185 MG/DL
CO2 SERPL-SCNC: 24 MMOL/L
CREAT SERPL-MCNC: 1.37 MG/DL
EGFR: 36 ML/MIN/1.73M2
EOSINOPHIL # BLD AUTO: 0.09 K/UL
EOSINOPHIL NFR BLD AUTO: 1.8 %
ESTIMATED AVERAGE GLUCOSE: 123 MG/DL
GLUCOSE SERPL-MCNC: 120 MG/DL
HBA1C MFR BLD HPLC: 5.9 %
HCT VFR BLD CALC: 39.2 %
HDLC SERPL-MCNC: 41 MG/DL
HGB BLD-MCNC: 12.6 G/DL
IMM GRANULOCYTES NFR BLD AUTO: 0.4 %
LDLC SERPL CALC-MCNC: 118 MG/DL
LYMPHOCYTES # BLD AUTO: 2.05 K/UL
LYMPHOCYTES NFR BLD AUTO: 39.9 %
MAN DIFF?: NORMAL
MCHC RBC-ENTMCNC: 32.1 G/DL
MCHC RBC-ENTMCNC: 32.1 PG
MCV RBC AUTO: 100 FL
MONOCYTES # BLD AUTO: 0.43 K/UL
MONOCYTES NFR BLD AUTO: 8.4 %
NEUTROPHILS # BLD AUTO: 2.53 K/UL
NEUTROPHILS NFR BLD AUTO: 49.1 %
NONHDLC SERPL-MCNC: 144 MG/DL
PLATELET # BLD AUTO: 132 K/UL
POTASSIUM SERPL-SCNC: 4.8 MMOL/L
PROT SERPL-MCNC: 7.3 G/DL
RBC # BLD: 3.92 M/UL
RBC # FLD: 13.7 %
SODIUM SERPL-SCNC: 141 MMOL/L
TRIGL SERPL-MCNC: 144 MG/DL
TSH SERPL-ACNC: 4.08 UIU/ML
WBC # FLD AUTO: 5.14 K/UL

## 2025-02-14 RX ORDER — AMLODIPINE BESYLATE 5 MG/1
5 TABLET ORAL
Qty: 180 | Refills: 0 | Status: ACTIVE | COMMUNITY
Start: 2025-02-14 | End: 1900-01-01

## 2025-03-10 ENCOUNTER — RX RENEWAL (OUTPATIENT)
Age: 89
End: 2025-03-10

## 2025-03-17 ENCOUNTER — APPOINTMENT (OUTPATIENT)
Dept: NEUROLOGY | Facility: CLINIC | Age: 89
End: 2025-03-17
Payer: MEDICARE

## 2025-03-17 PROCEDURE — 99213 OFFICE O/P EST LOW 20 MIN: CPT | Mod: 2W

## 2025-05-15 ENCOUNTER — RX RENEWAL (OUTPATIENT)
Age: 89
End: 2025-05-15

## 2025-08-19 ENCOUNTER — RX RENEWAL (OUTPATIENT)
Age: 89
End: 2025-08-19